# Patient Record
Sex: FEMALE | Race: WHITE | HISPANIC OR LATINO | Employment: UNEMPLOYED | URBAN - METROPOLITAN AREA
[De-identification: names, ages, dates, MRNs, and addresses within clinical notes are randomized per-mention and may not be internally consistent; named-entity substitution may affect disease eponyms.]

---

## 2018-10-04 ENCOUNTER — NON-PROVIDER VISIT (OUTPATIENT)
Dept: OBGYN | Facility: CLINIC | Age: 28
End: 2018-10-04

## 2018-10-04 DIAGNOSIS — Z32.01 PREGNANCY EXAMINATION OR TEST, POSITIVE RESULT: ICD-10-CM

## 2018-10-04 LAB
INT CON NEG: NEGATIVE
INT CON POS: POSITIVE
POC URINE PREGNANCY TEST: POSITIVE

## 2018-10-04 PROCEDURE — 81025 URINE PREGNANCY TEST: CPT | Performed by: OBSTETRICS & GYNECOLOGY

## 2018-10-16 ENCOUNTER — HOSPITAL ENCOUNTER (EMERGENCY)
Dept: HOSPITAL 8 - ED | Age: 28
Discharge: HOME | End: 2018-10-16
Payer: COMMERCIAL

## 2018-10-16 VITALS — DIASTOLIC BLOOD PRESSURE: 88 MMHG | SYSTOLIC BLOOD PRESSURE: 149 MMHG

## 2018-10-16 VITALS — HEIGHT: 67 IN | WEIGHT: 180.12 LBS | BODY MASS INDEX: 28.27 KG/M2

## 2018-10-16 DIAGNOSIS — O23.11: Primary | ICD-10-CM

## 2018-10-16 DIAGNOSIS — Z3A.08: ICD-10-CM

## 2018-10-16 LAB
ALBUMIN SERPL-MCNC: 4.1 G/DL (ref 3.4–5)
ALP SERPL-CCNC: 57 U/L (ref 45–117)
ALT SERPL-CCNC: 26 U/L (ref 12–78)
ANION GAP SERPL CALC-SCNC: 6 MMOL/L (ref 5–15)
BASOPHILS # BLD AUTO: 0.05 X10^3/UL (ref 0–0.1)
BASOPHILS NFR BLD AUTO: 1 % (ref 0–1)
BILIRUB SERPL-MCNC: 0.4 MG/DL (ref 0.2–1)
CALCIUM SERPL-MCNC: 8.7 MG/DL (ref 8.5–10.1)
CHLORIDE SERPL-SCNC: 106 MMOL/L (ref 98–107)
CREAT SERPL-MCNC: 0.72 MG/DL (ref 0.55–1.02)
CULTURE INDICATED?: YES
EOSINOPHIL # BLD AUTO: 0.08 X10^3/UL (ref 0–0.4)
EOSINOPHIL NFR BLD AUTO: 1 % (ref 1–7)
ERYTHROCYTE [DISTWIDTH] IN BLOOD BY AUTOMATED COUNT: 13.7 % (ref 9.6–15.2)
LYMPHOCYTES # BLD AUTO: 2.54 X10^3/UL (ref 1–3.4)
LYMPHOCYTES NFR BLD AUTO: 29 % (ref 22–44)
MCH RBC QN AUTO: 32 PG (ref 27–34.8)
MCHC RBC AUTO-ENTMCNC: 33.9 G/DL (ref 32.4–35.8)
MCV RBC AUTO: 94.3 FL (ref 80–100)
MD: NO
MICROSCOPIC: (no result)
MONOCYTES # BLD AUTO: 0.5 X10^3/UL (ref 0.2–0.8)
MONOCYTES NFR BLD AUTO: 6 % (ref 2–9)
NEUTROPHILS # BLD AUTO: 5.49 X10^3/UL (ref 1.8–6.8)
NEUTROPHILS NFR BLD AUTO: 63 % (ref 42–75)
PLATELET # BLD AUTO: 224 X10^3/UL (ref 130–400)
PMV BLD AUTO: 10.1 FL (ref 7.4–10.4)
PROT SERPL-MCNC: 8.8 G/DL (ref 6.4–8.2)
RBC # BLD AUTO: 4.79 X10^6/UL (ref 3.82–5.3)

## 2018-10-16 PROCEDURE — 85025 COMPLETE CBC W/AUTO DIFF WBC: CPT

## 2018-10-16 PROCEDURE — 81001 URINALYSIS AUTO W/SCOPE: CPT

## 2018-10-16 PROCEDURE — 84702 CHORIONIC GONADOTROPIN TEST: CPT

## 2018-10-16 PROCEDURE — 87077 CULTURE AEROBIC IDENTIFY: CPT

## 2018-10-16 PROCEDURE — 87086 URINE CULTURE/COLONY COUNT: CPT

## 2018-10-16 PROCEDURE — 36415 COLL VENOUS BLD VENIPUNCTURE: CPT

## 2018-10-16 PROCEDURE — 87186 SC STD MICRODIL/AGAR DIL: CPT

## 2018-10-16 PROCEDURE — 99285 EMERGENCY DEPT VISIT HI MDM: CPT

## 2018-10-16 PROCEDURE — 76801 OB US < 14 WKS SINGLE FETUS: CPT

## 2018-10-16 PROCEDURE — 80053 COMPREHEN METABOLIC PANEL: CPT

## 2019-02-07 NOTE — PROGRESS NOTES
Cc: New OB visit    Ms. Lauren is a 28 y.o.  presents today for a new OB appointment.  She first had a positive pregnancy test in early September.  She had a positive pregnancy test at [facility].  In November she had a UTI and so she went to [facility] and had an US performed.  She was 8 weeks along and they told her that her due date would be 19 which makes her 24w2d today.  Besides this UTI she has felt well during pregnancy.    She reports fetal movement, denies vaginal bleeding, denies leakage of fluid, denies contractions.     She denies nausea/vomiting, headaches, or urinary symptoms.        GYN History:  LMP 19. Menarche @18.  Menses irregular in that they occur anywhere from 1-3 months apart, lasting 5 days, not particularly heavy or painful.  Last pap unsure, no h/o abnormal pap, nor history of cone biopsy, LEEP or any other cervical, uterine or gynecologic surgery. No history of sexually transmitted diseases.       OB history:  G1 current      OB History    Para Term  AB Living   1             SAB TAB Ectopic Molar Multiple Live Births                    # Outcome Date GA Lbr Bijan/2nd Weight Sex Delivery Anes PTL Lv   1 Current                 History reviewed. No pertinent past medical history.   No PMH  No PSH   History reviewed. No pertinent surgical history.  Social History     Social History   • Marital status:      Spouse name: N/A   • Number of children: N/A   • Years of education: N/A     Occupational History   • Not on file.     Social History Main Topics   • Smoking status: Current Every Day Smoker     Years: 0.00   • Smokeless tobacco: Never Used   • Alcohol use No   • Drug use: Unknown   • Sexual activity: Yes     Partners: Male     Other Topics Concern   • Not on file     Social History Narrative   • No narrative on file     History reviewed. No pertinent family history.   Family history of DM2 in mom and dad    Allergies:   Allergies as of 2019   •  "(No Known Allergies)     PE:    Height 1.651 m (5' 5\"). /68 HR 61 183lbs    General: well developed, well nourished in no apparent distress  Head: normocephalic, atraumatic  Neck: neck is supple, non-tender, no thyromegaly, full range of motion  CVS: regular rate and rhythm, no peripheral edema  Lungs: Normal respiratory effort. Clear to auscultation bilaterally  Abdomen: Bowel sounds positive, nondistended, soft, nontender x4, no rebound or guarding.  Female GYN: normal external genitalia, no erythema, no discharge  Skin: No rashes, or ulcers or lesions seen  Psychiatric: appropriate affect, is alert and oriented x3, intact judgment and insight.    FHT: 140  FH: 24    A/P:  28 y.o.  @ 24w2d by reported 8wk US who presents for NOB appt    1. Prenatal care, subsequent pregnancy in second trimester  PRENATAL PANEL 3+HIV+UACXI    US-OB 2ND 3RD TRI COMPLETE    THINPREP RFLX HPV ASCUS W/CTNG    GLUCOSE 1HR GESTATIONAL    T.PALLIDUM AB EIA   2. 24 weeks gestation of pregnancy       #Prenatal care.  Patient was oriented to our obstetric practice and frequency of visits is discussed.  She is encouraged to continue prenatal vitamin use.  We reviewed safe foods and appropraite exercise during pregnancy.  We have requested records from [place] to obtain ultrasound which was performed in the ER at about 8 weeks gestation which will date this pregnancy.  Anatomy ultrasound also ordered.  Prenatal labs as well as second trimester labs ordered.  Too late for AFP testing.  #NOB panel, 1hr GTT, US ordered  #NOB packet given  #precautions reviewed.  #Return to clinic in 2-4 weeks.  LKB         "

## 2019-02-08 ENCOUNTER — HOSPITAL ENCOUNTER (OUTPATIENT)
Facility: MEDICAL CENTER | Age: 29
End: 2019-02-08
Attending: OBSTETRICS & GYNECOLOGY
Payer: COMMERCIAL

## 2019-02-08 ENCOUNTER — INITIAL PRENATAL (OUTPATIENT)
Dept: OBGYN | Facility: CLINIC | Age: 29
End: 2019-02-08
Payer: COMMERCIAL

## 2019-02-08 VITALS
HEART RATE: 61 BPM | HEIGHT: 65 IN | BODY MASS INDEX: 30.49 KG/M2 | WEIGHT: 183 LBS | SYSTOLIC BLOOD PRESSURE: 114 MMHG | DIASTOLIC BLOOD PRESSURE: 68 MMHG

## 2019-02-08 DIAGNOSIS — Z3A.24 24 WEEKS GESTATION OF PREGNANCY: ICD-10-CM

## 2019-02-08 DIAGNOSIS — Z34.82 PRENATAL CARE, SUBSEQUENT PREGNANCY IN SECOND TRIMESTER: ICD-10-CM

## 2019-02-08 PROCEDURE — 87591 N.GONORRHOEAE DNA AMP PROB: CPT

## 2019-02-08 PROCEDURE — 59401 PR NEW OB VISIT: CPT | Performed by: OBSTETRICS & GYNECOLOGY

## 2019-02-08 PROCEDURE — 87491 CHLMYD TRACH DNA AMP PROBE: CPT

## 2019-02-08 PROCEDURE — 88175 CYTOPATH C/V AUTO FLUID REDO: CPT

## 2019-02-09 DIAGNOSIS — Z34.82 PRENATAL CARE, SUBSEQUENT PREGNANCY IN SECOND TRIMESTER: ICD-10-CM

## 2019-02-13 LAB
C TRACH DNA GENITAL QL NAA+PROBE: NEGATIVE
CYTOLOGY REG CYTOL: NORMAL
N GONORRHOEA DNA GENITAL QL NAA+PROBE: NEGATIVE
SPECIMEN SOURCE: NORMAL

## 2019-02-14 ENCOUNTER — HOSPITAL ENCOUNTER (OUTPATIENT)
Dept: LAB | Facility: MEDICAL CENTER | Age: 29
End: 2019-02-14
Attending: OBSTETRICS & GYNECOLOGY
Payer: COMMERCIAL

## 2019-02-14 DIAGNOSIS — Z34.82 PRENATAL CARE, SUBSEQUENT PREGNANCY IN SECOND TRIMESTER: ICD-10-CM

## 2019-02-14 LAB
ABO GROUP BLD: NORMAL
APPEARANCE UR: CLEAR
BACTERIA #/AREA URNS HPF: ABNORMAL /HPF
BASOPHILS # BLD AUTO: 0.5 % (ref 0–1.8)
BASOPHILS # BLD: 0.04 K/UL (ref 0–0.12)
BILIRUB UR QL STRIP.AUTO: NEGATIVE
BLD GP AB SCN SERPL QL: NORMAL
COLOR UR: YELLOW
EOSINOPHIL # BLD AUTO: 0.06 K/UL (ref 0–0.51)
EOSINOPHIL NFR BLD: 0.7 % (ref 0–6.9)
EPI CELLS #/AREA URNS HPF: ABNORMAL /HPF
ERYTHROCYTE [DISTWIDTH] IN BLOOD BY AUTOMATED COUNT: 45.7 FL (ref 35.9–50)
GLUCOSE 1H P 50 G GLC PO SERPL-MCNC: 107 MG/DL (ref 70–139)
GLUCOSE UR STRIP.AUTO-MCNC: NEGATIVE MG/DL
HBV SURFACE AG SER QL: NEGATIVE
HCT VFR BLD AUTO: 38.2 % (ref 37–47)
HGB BLD-MCNC: 12.4 G/DL (ref 12–16)
HIV 1+2 AB+HIV1 P24 AG SERPL QL IA: NON REACTIVE
HYALINE CASTS #/AREA URNS LPF: ABNORMAL /LPF
IMM GRANULOCYTES # BLD AUTO: 0.05 K/UL (ref 0–0.11)
IMM GRANULOCYTES NFR BLD AUTO: 0.6 % (ref 0–0.9)
KETONES UR STRIP.AUTO-MCNC: NEGATIVE MG/DL
LEUKOCYTE ESTERASE UR QL STRIP.AUTO: ABNORMAL
LYMPHOCYTES # BLD AUTO: 1.86 K/UL (ref 1–4.8)
LYMPHOCYTES NFR BLD: 21.9 % (ref 22–41)
MCH RBC QN AUTO: 31.8 PG (ref 27–33)
MCHC RBC AUTO-ENTMCNC: 32.5 G/DL (ref 33.6–35)
MCV RBC AUTO: 97.9 FL (ref 81.4–97.8)
MICRO URNS: ABNORMAL
MONOCYTES # BLD AUTO: 0.44 K/UL (ref 0–0.85)
MONOCYTES NFR BLD AUTO: 5.2 % (ref 0–13.4)
NEUTROPHILS # BLD AUTO: 6.05 K/UL (ref 2–7.15)
NEUTROPHILS NFR BLD: 71.1 % (ref 44–72)
NITRITE UR QL STRIP.AUTO: NEGATIVE
NRBC # BLD AUTO: 0 K/UL
NRBC BLD-RTO: 0 /100 WBC
PH UR STRIP.AUTO: 6.5 [PH]
PLATELET # BLD AUTO: 187 K/UL (ref 164–446)
PMV BLD AUTO: 12.3 FL (ref 9–12.9)
PROT UR QL STRIP: NEGATIVE MG/DL
RBC # BLD AUTO: 3.9 M/UL (ref 4.2–5.4)
RBC # URNS HPF: ABNORMAL /HPF
RBC UR QL AUTO: NEGATIVE
RH BLD: NORMAL
RUBV AB SER QL: 70 IU/ML
SP GR UR STRIP.AUTO: 1.02
TREPONEMA PALLIDUM IGG+IGM AB [PRESENCE] IN SERUM OR PLASMA BY IMMUNOASSAY: NON REACTIVE
UROBILINOGEN UR STRIP.AUTO-MCNC: 0.2 MG/DL
WBC # BLD AUTO: 8.5 K/UL (ref 4.8–10.8)
WBC #/AREA URNS HPF: ABNORMAL /HPF

## 2019-02-14 PROCEDURE — 86900 BLOOD TYPING SEROLOGIC ABO: CPT

## 2019-02-14 PROCEDURE — 86850 RBC ANTIBODY SCREEN: CPT

## 2019-02-14 PROCEDURE — 87389 HIV-1 AG W/HIV-1&-2 AB AG IA: CPT

## 2019-02-14 PROCEDURE — 36415 COLL VENOUS BLD VENIPUNCTURE: CPT

## 2019-02-14 PROCEDURE — 82950 GLUCOSE TEST: CPT

## 2019-02-14 PROCEDURE — 81001 URINALYSIS AUTO W/SCOPE: CPT

## 2019-02-14 PROCEDURE — 86901 BLOOD TYPING SEROLOGIC RH(D): CPT

## 2019-02-14 PROCEDURE — 85025 COMPLETE CBC W/AUTO DIFF WBC: CPT

## 2019-02-14 PROCEDURE — 86762 RUBELLA ANTIBODY: CPT

## 2019-02-14 PROCEDURE — 86780 TREPONEMA PALLIDUM: CPT

## 2019-02-14 PROCEDURE — 87340 HEPATITIS B SURFACE AG IA: CPT

## 2019-02-19 ENCOUNTER — HOSPITAL ENCOUNTER (OUTPATIENT)
Dept: RADIOLOGY | Facility: MEDICAL CENTER | Age: 29
End: 2019-02-19
Attending: OBSTETRICS & GYNECOLOGY
Payer: COMMERCIAL

## 2019-02-19 DIAGNOSIS — Z34.82 PRENATAL CARE, SUBSEQUENT PREGNANCY IN SECOND TRIMESTER: ICD-10-CM

## 2019-02-19 PROCEDURE — 76805 OB US >/= 14 WKS SNGL FETUS: CPT

## 2019-03-11 ENCOUNTER — ROUTINE PRENATAL (OUTPATIENT)
Dept: OBGYN | Facility: CLINIC | Age: 29
End: 2019-03-11
Payer: COMMERCIAL

## 2019-03-11 VITALS — DIASTOLIC BLOOD PRESSURE: 70 MMHG | WEIGHT: 186 LBS | BODY MASS INDEX: 30.95 KG/M2 | SYSTOLIC BLOOD PRESSURE: 110 MMHG

## 2019-03-11 DIAGNOSIS — Z34.03 ENCOUNTER FOR SUPERVISION OF NORMAL FIRST PREGNANCY IN THIRD TRIMESTER: Primary | ICD-10-CM

## 2019-03-11 DIAGNOSIS — R87.612 LGSIL ON PAP SMEAR OF CERVIX: ICD-10-CM

## 2019-03-11 PROBLEM — Z34.93 SUPERVISION OF NORMAL PREGNANCY IN THIRD TRIMESTER: Status: ACTIVE | Noted: 2019-03-11

## 2019-03-11 PROCEDURE — 90040 PR PRENATAL FOLLOW UP: CPT | Performed by: NURSE PRACTITIONER

## 2019-03-11 PROCEDURE — 90715 TDAP VACCINE 7 YRS/> IM: CPT | Performed by: NURSE PRACTITIONER

## 2019-03-11 PROCEDURE — 90471 IMMUNIZATION ADMIN: CPT | Performed by: NURSE PRACTITIONER

## 2019-03-11 NOTE — PATIENT INSTRUCTIONS
P:  1.  PP contraception pills?           2.  Instructions given on FKCs.          3.  Questions answered.          4.  Encouraged pt to tour L&D.          5.  Encourage adequate water intake.        6.  Labs reviewed w pt.        7.   labor precautions reviewed.         8.  F/u 2 wks.        9.  TDap given and flu declined.      10.  Needs colpo asap.       11.  Limited US ordered to complete anatomy.

## 2019-03-11 NOTE — PROGRESS NOTES
S:  Pt is  at 28w5d for routine OB follow up.  Reports some bone pain.  Reports good FM.  Denies VB, LOF, RUCs or vaginal DC.    O:  Please see above vitals.        FHTs: 135        Fundal ht: 28 cm.    Complete OB US  2019 1:57 PM    HISTORY/REASON FOR EXAM:  Evaluate fetal anatomy    TECHNIQUE/EXAM DESCRIPTION: OB complete ultrasound.    COMPARISON:  None    FINDINGS:  Fetal Lie:  Vertex  LMP:  2018  Clinical RAIMUNDO by LMP:  2019    Placenta (Location):  Posterior  Placenta Previa: No  Placental Grade: I    Amniotic Fluid Volume:  SISSY = 15.3 cm    Fetal Heart Rate:  144 bpm    Cervical Length:  5.42 cm transabdominal    No maternal adnexal mass is identified.    Umbilical Artery S/D Ratio(s):  Not applicable    Fetal Anatomy  (Seen or Not Seen)  Lateral Ventricles     Seen  Cisterna Magna        Seen  Cerebellum              Seen  CSP             Seen  Orbits             Seen  Face/Lips                Seen  Cord Insertion         Seen  Placental CI         Seen  4 Chamber Heart     Seen  LVOT               NOT seen  RVOT              Seen  3 Vessel View     NOT seen  Stomach       Seen  Kidneys                   Seen  Urinary Bladder      Seen  Spine                       Seen  3 Vessel Cord          Seen  Both Upper Extremities    Seen  Both Lower Extremities    Seen  Diaphragm             Seen  Movement       Seen  Gender:  Likely male    Fetal Biometry  BPD    6.50 cm, 26w 2d  HC    23.13 cm, 25w 1d  AC    19.60 cm, 24w 2d  Femur Length    4.57 cm, 25w 1d  Humerus Length    4.20 cm, 25w 2d  Cerebellum Diameter   2.69 cm    EGA by this US:  25w 2d  RAIMUNDO by this US: 2019  RAIMUNDO by 1st US:  2019    Estimated Fetal Weight:  734 g    Comments:  The left ventricular outflow tract and the three-vessel view cannot be adequately evaluated because they were not well seen.   Impression       Single intrauterine pregnancy of an estimated gestational age of 25w 2d with an estimated date of delivery of  2019.    LVOT and three-vessel view were not well seen and therefore images could not be obtained. The remaining fetal anatomic survey was within the limits of normal.         A:  IUP at 28w5d  Patient Active Problem List    Diagnosis Date Noted   • LGSIL on Pap smear of cervix 2019   • Supervision of normal pregnancy in third trimester 2019        P:  1.  PP contraception pills?           2.  Instructions given on FKCs.          3.  Questions answered.          4.  Encouraged pt to tour L&D.          5.  Encourage adequate water intake.        6.  Labs reviewed w pt.        7.   labor precautions reviewed.         8.  F/u 2 wks.        9.  TDap given and flu declined.      10.  Needs colpo asap.       11.  Limited US ordered to complete anatomy.    I have placed the below orders and discussed them with an approved delegating provider. The MA is performing the below orders under the direction of  Dr. Sutherland.

## 2019-03-11 NOTE — PROGRESS NOTES
Pt here today for OB follow up  Pt states her bones hurt.  She is feeling dizzy throughout the day and is always tired.  Reports +FM  Pharmacy Confirmed.  Chaperone offered and accepted  ALEX count and TDAP given today.

## 2019-03-11 NOTE — LETTER
"Count Your Baby's Movements  Another step to a healthy delivery    A Epic Dress Re Test             Dept: [phone]    How Many Weeks Pregnant? 28w 5d    Date to Begin Counting: 3/11/2018              How to use this chart    One way for your physician to keep track of your baby's health is by knowing how often the baby moves (or \"kicks\") in your womb.  You can help your physician to do this by using this chart every day.    Every day, you should see how many hours it takes for your baby to move 10 times.  Start in the morning, as soon as you get up.    · First, write down the time your baby moves until you get to 10.  · Check off one box every time your baby moves until you get to 10.  · Write down the time you finished counting in the last column.  · Total how long it took to count up all 10 movements.  · Finally, fill in the box that shows how long this took.  After counting 10 movements, you no longer have to count any more that day.  The next morning, just start counting again as soon as you get up.    What should you call a \"movement\"?  It is hard to say, because it will feel different from one mother to another and from one pregnancy to the next.  The important thing is that you count the movements the same way throughout your pregnancy.  If you have more questions, you should ask your physician.    Count carefully every day!  SAMPLE:  Week 28    How many hours did it take to feel 10 movements?       Start  Time     1     2     3     4     5     6     7     8     9     10   Finish Time   Mon 8:20 ·  ·  ·  ·  ·  ·  ·  ·  ·  ·  11:40   Tue Wed Thu Fri               Sat               Sun                 IMPORTANT: You should contact your physician if it takes more than two hours for you to feel 10 movements.  Each morning, write down the time and start to count the movements of your baby.  Keep track by checking off one box every time you feel one movement.  When you " "have felt 10 \"kicks\", write down the time you finished counting in the last column.  Then fill in the   box (over the check murali) for the number of hours it took.  Be sure to read the complete instructions on the previous page.            "

## 2019-03-12 ENCOUNTER — HOSPITAL ENCOUNTER (OUTPATIENT)
Dept: RADIOLOGY | Facility: MEDICAL CENTER | Age: 29
End: 2019-03-12
Attending: NURSE PRACTITIONER
Payer: COMMERCIAL

## 2019-03-12 DIAGNOSIS — Z34.03 ENCOUNTER FOR SUPERVISION OF NORMAL FIRST PREGNANCY IN THIRD TRIMESTER: ICD-10-CM

## 2019-03-12 PROCEDURE — 76816 OB US FOLLOW-UP PER FETUS: CPT

## 2019-03-23 ENCOUNTER — APPOINTMENT (OUTPATIENT)
Dept: RADIOLOGY | Facility: MEDICAL CENTER | Age: 29
End: 2019-03-23
Attending: OBSTETRICS & GYNECOLOGY
Payer: COMMERCIAL

## 2019-03-23 ENCOUNTER — HOSPITAL ENCOUNTER (EMERGENCY)
Facility: MEDICAL CENTER | Age: 29
End: 2019-03-23
Payer: COMMERCIAL

## 2019-03-23 ENCOUNTER — HOSPITAL ENCOUNTER (OUTPATIENT)
Facility: MEDICAL CENTER | Age: 29
End: 2019-03-23
Attending: OBSTETRICS & GYNECOLOGY | Admitting: OBSTETRICS & GYNECOLOGY
Payer: COMMERCIAL

## 2019-03-23 VITALS
TEMPERATURE: 98.5 F | RESPIRATION RATE: 16 BRPM | HEIGHT: 65 IN | BODY MASS INDEX: 30.99 KG/M2 | HEART RATE: 75 BPM | DIASTOLIC BLOOD PRESSURE: 67 MMHG | WEIGHT: 186 LBS | SYSTOLIC BLOOD PRESSURE: 114 MMHG

## 2019-03-23 LAB
A1 MICROGLOB PLACENTAL VAG QL: NEGATIVE
APPEARANCE UR: CLEAR
COLOR UR AUTO: YELLOW
CRYSTALS AMN MICRO: NORMAL
GLUCOSE UR QL STRIP.AUTO: NEGATIVE MG/DL
KETONES UR QL STRIP.AUTO: NEGATIVE MG/DL
LEUKOCYTE ESTERASE UR QL STRIP.AUTO: NEGATIVE
NITRITE UR QL STRIP.AUTO: NEGATIVE
PH UR STRIP.AUTO: 7 [PH]
PROT UR QL STRIP: NEGATIVE MG/DL
RBC UR QL AUTO: NEGATIVE
SP GR UR: 1.01

## 2019-03-23 PROCEDURE — 76815 OB US LIMITED FETUS(S): CPT

## 2019-03-23 PROCEDURE — 89060 EXAM SYNOVIAL FLUID CRYSTALS: CPT

## 2019-03-23 PROCEDURE — 81002 URINALYSIS NONAUTO W/O SCOPE: CPT

## 2019-03-23 PROCEDURE — 59025 FETAL NON-STRESS TEST: CPT

## 2019-03-23 PROCEDURE — 84112 EVAL AMNIOTIC FLUID PROTEIN: CPT

## 2019-03-23 NOTE — PROGRESS NOTES
EDC  30 3/7     1400-pt presents from home with c/o possibly leaking this afternoon, states that she woke up around 1300 and when she stood up she noticed a wet spot on her bed, pt did state that she had intercourse earlier this morning, no c/o leaking since she noticed the spot, no c/o bleeding, pain or uc's, states baby is moving normally, placed on external monitors, vs taken, SSE performed, no pooling noted, small amount of Robinson fluid noted, fern slide prepared and sent, SVE fingertip/thick/floating  1430-fern back positive, TC Dr Martinez, report given, wants amnisure run, will go observe slide herself later  1435-amnisure collected and sent  1500-amnisure back negative, TC Dr Martinez, report given, OB US for SISSY and growth ordered  1533-US here, SISSY 16.6 cm, S=D for growth  1545-TC Dr Martinez, report given, in to assess pt, discussed pelvic rest with pt and gave instructions to come back in if anymore leaking and to keep appt on 3/25, discharge order received  1605-pt discharged home with PTL precautions and further instruction to come back if she noted any more leaking, verbalized understanding, left ambulatory with family

## 2019-03-23 NOTE — PROGRESS NOTES
"Leslie Loja   30w3d  Admission DX: Pregnancy    Date of Admission: 3/23/2019  Patient Active Problem List    Diagnosis Date Noted   • LGSIL on Pap smear of cervix 2019   • Supervision of normal pregnancy in third trimester 2019       Subjective:   Pt presented with LOF today after IC. No vb, no tightening, cramping or UC. Good FM    Objective:   Vitals:    19 1407   BP: 114/67   Pulse: 75   Weight: 84.4 kg (186 lb)   Height: 1.651 m (5' 5\")     FHT: 130/mod leticia/+acels  Gen: comfortale  SVE: no pooling  Abdomen: gravid, soft, NT  Ext: NT, no edema    Meds:   No current facility-administered medications for this encounter.     Labs:    Lab:   Recent Results (from the past 72 hour(s))   Ferning if suspected rupture of membranes (ROM)    Collection Time: 19  2:10 PM   Result Value Ref Range    Fern Test On Amniotic Fluid see below Not present   POC UA    Collection Time: 19  2:14 PM   Result Value Ref Range    POC Color Yellow     POC Appearance Clear     POC Glucose Negative Negative mg/dL    POC Ketones Negative Negative mg/dL    POC Specific Gravity 1.015 1.005 - 1.030    POC Blood Negative Negative    POC Urine PH 7.0 5.0 - 8.0    POC Protein Negative Negative mg/dL    POC Nitrites Negative Negative    POC Leukocyte Esterase Negative Negative   AMNISURE ROM ASSAY    Collection Time: 19  2:35 PM   Result Value Ref Range    AmniSure ROM Negative Negative       Assessment:  Leslie Loja is a 28 y.o.  @ 30w3d with LOF after IC.  Amnisure neg.  SISSY 16 cm  Cat 1 tracing    Plan:  DC home  Strict PPROM and PTL precautions reviewed  F/u with [facility] Monday          "

## 2019-03-25 ENCOUNTER — ROUTINE PRENATAL (OUTPATIENT)
Dept: OBGYN | Facility: CLINIC | Age: 29
End: 2019-03-25
Payer: COMMERCIAL

## 2019-03-25 VITALS — WEIGHT: 189 LBS | BODY MASS INDEX: 31.45 KG/M2 | SYSTOLIC BLOOD PRESSURE: 126 MMHG | DIASTOLIC BLOOD PRESSURE: 82 MMHG

## 2019-03-25 DIAGNOSIS — Z34.03 SUPERVISION OF NORMAL FIRST PREGNANCY IN THIRD TRIMESTER: ICD-10-CM

## 2019-03-25 PROCEDURE — 90040 PR PRENATAL FOLLOW UP: CPT | Performed by: ADVANCED PRACTICE MIDWIFE

## 2019-03-25 NOTE — PROGRESS NOTES
Pt here today for OB follow up @ 30w5d  Pt denies vaginal bleeding or cramps/contractions  Reports +FM  Good # verified  Pharmacy Confirmed.

## 2019-03-25 NOTE — PROGRESS NOTES
Patient here for BERE visit at 30.5 of pregnancy. She affirms fetal movement, denies vaginal bleeding or cramping/regular contractions. She reports overall today she is feeling well and without complaints. She was seen in L & D for concern of leaking fluid. We reviewed fluid and contractions in detail. Adequate hydration reviewed in detail with patient. Unsure of PP BCM at this time. Will likely start with condoms. Precautions and warning signs reviewed with patient. RTC in 2 week(s) for BERE visit.

## 2019-04-05 ENCOUNTER — ROUTINE PRENATAL (OUTPATIENT)
Dept: OBGYN | Facility: MEDICAL CENTER | Age: 29
End: 2019-04-05
Payer: COMMERCIAL

## 2019-04-05 VITALS — SYSTOLIC BLOOD PRESSURE: 120 MMHG | WEIGHT: 192 LBS | BODY MASS INDEX: 31.95 KG/M2 | DIASTOLIC BLOOD PRESSURE: 70 MMHG

## 2019-04-05 DIAGNOSIS — Z34.03 SUPERVISION OF NORMAL FIRST PREGNANCY IN THIRD TRIMESTER: ICD-10-CM

## 2019-04-05 PROCEDURE — 90040 PR PRENATAL FOLLOW UP: CPT | Performed by: NURSE PRACTITIONER

## 2019-04-05 NOTE — PROGRESS NOTES
SUBJECTIVE:  Pt is a 28 y.o.   at 32w2d  gestation. Presents today for follow-up prenatal care. Reports no issues at this time except for vaginal itching for which she would like to try monistat 7.  Had one episode of cramping that concerned her but resolved.   Reports feeling good  fetal movement. Denies cramping/contractions, bleeding or leaking of fluid. Denies dysuria, headaches, N/V. Generally feels well today. Recent ER or L&D visits  - none.     OBJECTIVE:  - See prenatal vitals flow  -   Vitals:    19 1327   BP: 120/70   Weight: 87.1 kg (192 lb)      Fundal Height - 33  FHT - 139  US normal with limited cardiac visualization  Prenatal labs normal prenatal panel, normal glucose. Abnormal pap              ASSESSMENT:   - IUP at 32w2d    - S=D   -   Patient Active Problem List    Diagnosis Date Noted   • LGSIL on Pap smear of cervix 2019     Priority: High   • Supervision of normal pregnancy in third trimester 2019     Priority: High         PLAN:  - S/sx pregnancy and labor warning signs vs general discomforts discussed  - Fetal movements and kick counts reviewed   - Adequate hydration reinforced  - Nutrition/exercise/vitamin education; continued PNV  - if monistat 7 does not resolve sx she will let us know  Return 2 weeks.

## 2019-04-05 NOTE — PROGRESS NOTES
Pt here today for OB follow up  Pt states denies VB and LOF   Reports +FM  Pharmacy Confirmed.

## 2019-04-17 ENCOUNTER — ROUTINE PRENATAL (OUTPATIENT)
Dept: OBGYN | Facility: CLINIC | Age: 29
End: 2019-04-17
Payer: COMMERCIAL

## 2019-04-17 VITALS — BODY MASS INDEX: 32.12 KG/M2 | SYSTOLIC BLOOD PRESSURE: 116 MMHG | WEIGHT: 193 LBS | DIASTOLIC BLOOD PRESSURE: 78 MMHG

## 2019-04-17 DIAGNOSIS — Z34.83 ENCOUNTER FOR SUPERVISION OF OTHER NORMAL PREGNANCY IN THIRD TRIMESTER: ICD-10-CM

## 2019-04-17 DIAGNOSIS — R87.612 LGSIL ON PAP SMEAR OF CERVIX: ICD-10-CM

## 2019-04-17 PROCEDURE — 90040 PR PRENATAL FOLLOW UP: CPT | Performed by: OBSTETRICS & GYNECOLOGY

## 2019-04-17 NOTE — PROGRESS NOTES
S: Pt presents for routine OB follow up. Reports Good fetal movements.  No contractions, vaginal bleeding, or leakage of fluid.    Questions answered.    O: /78   Wt 87.5 kg (193 lb)   BMI 32.12 kg/m²   Patients' weight gain, fluid intake and exercise level discussed.  Vitals, fundal height , fetal position, and FHR reviewed on flowsheet    Lab:No results found for this or any previous visit (from the past 336 hour(s)).    A/P:  28 y.o.  at 34w0d presents for routine obstetric follow-up.  Size equals dates   Encounter Diagnoses   Name Primary?   • Encounter for supervision of other normal pregnancy in third trimester    • LGSIL on Pap smear of cervix          1.  Continue prenatal vitamins.  2.  Fetal kick counts daily discussed.  3.  Exercise at least 30 minutes daily.  4.  Drink at least 2L of water daily  5.  Pregnancy/  Labor precautions discussed  6.  Follow-up in 1 week.  7.  GBS at f/u visit discussed

## 2019-04-17 NOTE — PROGRESS NOTES
Pt here today for OB follow up  Pt denies vaginal bleeding or contractopms  Reports +FM  Good # verified  Pharmacy Confirmed.

## 2019-05-02 ENCOUNTER — HOSPITAL ENCOUNTER (OUTPATIENT)
Facility: MEDICAL CENTER | Age: 29
End: 2019-05-02
Attending: OBSTETRICS & GYNECOLOGY
Payer: COMMERCIAL

## 2019-05-02 ENCOUNTER — ROUTINE PRENATAL (OUTPATIENT)
Dept: OBGYN | Facility: CLINIC | Age: 29
End: 2019-05-02
Payer: COMMERCIAL

## 2019-05-02 VITALS — WEIGHT: 195 LBS | SYSTOLIC BLOOD PRESSURE: 118 MMHG | BODY MASS INDEX: 32.45 KG/M2 | DIASTOLIC BLOOD PRESSURE: 70 MMHG

## 2019-05-02 DIAGNOSIS — Z34.93 THIRD TRIMESTER PREGNANCY: ICD-10-CM

## 2019-05-02 PROCEDURE — 87081 CULTURE SCREEN ONLY: CPT

## 2019-05-02 PROCEDURE — 90040 PR PRENATAL FOLLOW UP: CPT | Performed by: OBSTETRICS & GYNECOLOGY

## 2019-05-02 PROCEDURE — 87150 DNA/RNA AMPLIFIED PROBE: CPT

## 2019-05-02 NOTE — PROGRESS NOTES
S: Pt presents for routine OB follow up. Good fetal movement.  No contractions, vaginal bleeding, or leakage of fluid.    Questions answered.    Is complaining of left-sided pain that was worse last night.  After sleeping it feels much better but she still experiencing the left-sided pain.  She does admit to constipation and will go to sometimes 3 days without having a bowel movement.    O: /70   Wt 88.5 kg (195 lb)   BMI 32.45 kg/m²   Patients' weight gain, fluid intake and exercise level discussed.  Vitals, fundal height , fetal position, and FHR reviewed on flowsheet    Lab:No results found for this or any previous visit (from the past 336 hour(s)).    A/P:  28 y.o.  at 36w1d presents for routine obstetric follow-up.  Size equals dates and/or scan    1.  Continue prenatal vitamins.  2.  Fetal kick counts.  3.  Exercise at least 30 minutes daily.  4.  Drink at least 2L of water daily  5.  Labor precautions educated.  6.  Follow-up in 1 weeks.  7.  GBS done today  8.  Discussed that her pain is likely related to constipation/gas.  Recommended Colace as a stool softener as well as Gas-X for gas pain.  Recommended that she only take the Gas-X when she is having pain.  Recommended Colace nightly.  If she is still unable to have a bowel movement after using the Colace, she can use MiraLAX to have bowel movements more regularly.

## 2019-05-02 NOTE — PROGRESS NOTES
Pt here today for OB follow up  Pt states she had abdominal cramping last night  Reports +FM  Pharmacy Confirmed.  Chaperone offered and declined   GBS today

## 2019-05-03 LAB — GP B STREP DNA SPEC QL NAA+PROBE: NEGATIVE

## 2019-05-09 ENCOUNTER — ROUTINE PRENATAL (OUTPATIENT)
Dept: OBGYN | Facility: MEDICAL CENTER | Age: 29
End: 2019-05-09
Payer: COMMERCIAL

## 2019-05-09 VITALS — BODY MASS INDEX: 32.62 KG/M2 | WEIGHT: 196 LBS | DIASTOLIC BLOOD PRESSURE: 72 MMHG | SYSTOLIC BLOOD PRESSURE: 118 MMHG

## 2019-05-09 DIAGNOSIS — Z34.83 ENCOUNTER FOR SUPERVISION OF OTHER NORMAL PREGNANCY IN THIRD TRIMESTER: ICD-10-CM

## 2019-05-09 PROCEDURE — 90040 PR PRENATAL FOLLOW UP: CPT | Performed by: OBSTETRICS & GYNECOLOGY

## 2019-05-09 NOTE — PROGRESS NOTES
Pt here today for OB follow up  Pt denies vaginal bleeding or cramps/contractions  Reports +FM  Good #  Verified  Pharmacy Confirmed.  GBS negative

## 2019-05-09 NOTE — PROGRESS NOTES
Chief complaint: Return OB visit    S: Pt presents for routine OB follow up. Good fetal movement.  No contractions, vaginal bleeding, or leakage of fluid.    Questions answered.    O: /72   Wt 88.9 kg (196 lb)   BMI 32.62 kg/m²   Patients' weight gain, fluid intake and exercise level discussed.  Vitals, fundal height , fetal position, and FHR reviewed on flowsheet    Lab:  Recent Results (from the past 336 hour(s))   GRP B STREP, BY PCR (DAWSON BROTH)    Collection Time: 19  3:02 PM   Result Value Ref Range    Strep Gp B DNA PCR Negative Negative       A/P:  28 y.o.  at 37w1d presents for routine obstetric follow-up.  Size equals dates and/or scan    1.  Continue prenatal vitamins.  2.  Fetal kick counts.  3.  Exercise at least 30 minutes daily.  4.  Drink at least 2L of water daily  5.  Labor precautions educated.  6.  Follow-up in 1 weeks.  7.  GBS negative    Labs reviewed the patient.    All questions answered

## 2019-05-16 ENCOUNTER — ROUTINE PRENATAL (OUTPATIENT)
Dept: OBGYN | Facility: CLINIC | Age: 29
End: 2019-05-16
Payer: COMMERCIAL

## 2019-05-16 VITALS — DIASTOLIC BLOOD PRESSURE: 80 MMHG | BODY MASS INDEX: 32.78 KG/M2 | WEIGHT: 197 LBS | SYSTOLIC BLOOD PRESSURE: 122 MMHG

## 2019-05-16 DIAGNOSIS — Z34.83 ENCOUNTER FOR SUPERVISION OF OTHER NORMAL PREGNANCY IN THIRD TRIMESTER: Primary | ICD-10-CM

## 2019-05-16 PROCEDURE — 90040 PR PRENATAL FOLLOW UP: CPT | Performed by: NURSE PRACTITIONER

## 2019-05-16 NOTE — PROGRESS NOTES
Pt here today for OB follow up  Pt states  Reports +FM  Good # verified  Pharmacy Confirmed.  Requested cervical check

## 2019-05-16 NOTE — PROGRESS NOTES
S) Pt is a 28 y.o.   at 38w1d  gestation. Routine prenatal care today. No complaints. She states she is having some contractions, but mostly at night. SVE and IOL referral today. Labor precautions discussed, all questions answered.    Fetal movement Normal  Cramping no  VB no  LOF no   Denies dysuria. Generally feels well today. Good self-care activities identified. Denies headaches, swelling, visual changes, or epigastric pain .     O) There were no vitals taken for this visit.        Labs:       PNL: WNL       GCT: 107        AFP: Not Examined       GBS: negative       Pertinent ultrasound -        19- Survey WNL, but not able to see LVOT and 3 VC, SISSY 15.3cm, c/w prev dating. Needs repeat scan  3/12/19- Follow up anatomy, all WNL, SISSY 15.8cm, c/w prev dating.    A) IUP at 38w1d       S=D         Patient Active Problem List    Diagnosis Date Noted   • LGSIL on Pap smear of cervix 2019     Priority: High   • Supervision of normal pregnancy in third trimester 2019     Priority: High          SVE: closed/50/-2       Chaperone offered: yes but declines         TDAP: yes       FLU: no        BTL: no       : n/a       C/S Consent: n/a       IOL or C/S scheduled: no- referral placed today       LAST PAP: 19- LGSIL- will repeat PP         P) s/s ptl vs general discomforts. Fetal movements reviewed. General ed and anticipatory guidance. Nutrition/exercise/vitamin. Plans breast Plans pp contraception- unsure  Continue PNV.

## 2019-05-19 ENCOUNTER — HOSPITAL ENCOUNTER (OUTPATIENT)
Facility: MEDICAL CENTER | Age: 29
End: 2019-05-19
Attending: OBSTETRICS & GYNECOLOGY | Admitting: OBSTETRICS & GYNECOLOGY
Payer: COMMERCIAL

## 2019-05-19 VITALS
HEIGHT: 65 IN | WEIGHT: 197 LBS | SYSTOLIC BLOOD PRESSURE: 133 MMHG | BODY MASS INDEX: 32.82 KG/M2 | RESPIRATION RATE: 16 BRPM | HEART RATE: 68 BPM | TEMPERATURE: 98.2 F | DIASTOLIC BLOOD PRESSURE: 76 MMHG

## 2019-05-19 PROCEDURE — 59025 FETAL NON-STRESS TEST: CPT

## 2019-05-19 PROCEDURE — 89060 EXAM SYNOVIAL FLUID CRYSTALS: CPT

## 2019-05-20 LAB — CRYSTALS AMN MICRO: NORMAL

## 2019-05-20 NOTE — PROGRESS NOTES
EDC  38      1640-pt presents from home with c/o leaking fluid and some uc's since yesterday, no c/o bleeding or other pain, states baby is moving normally, placed on external monitors, vs taken, SSE performed, no pooling noted, sanju slide prepared and sent, SVE closed/thick/high  1730-sanju back negative, TC Dr Wakefield, report given, discharge order received  1735-pt discharged home with labor precautions, verbalized understanding, left ambulatory with SO

## 2019-05-23 ENCOUNTER — ROUTINE PRENATAL (OUTPATIENT)
Dept: OBGYN | Facility: CLINIC | Age: 29
End: 2019-05-23
Payer: COMMERCIAL

## 2019-05-23 VITALS — BODY MASS INDEX: 33.12 KG/M2 | DIASTOLIC BLOOD PRESSURE: 80 MMHG | WEIGHT: 199 LBS | SYSTOLIC BLOOD PRESSURE: 122 MMHG

## 2019-05-23 DIAGNOSIS — R87.612 LGSIL ON PAP SMEAR OF CERVIX: ICD-10-CM

## 2019-05-23 DIAGNOSIS — Z34.03 ENCOUNTER FOR SUPERVISION OF NORMAL FIRST PREGNANCY IN THIRD TRIMESTER: ICD-10-CM

## 2019-05-23 PROCEDURE — 90040 PR PRENATAL FOLLOW UP: CPT | Performed by: OBSTETRICS & GYNECOLOGY

## 2019-05-23 NOTE — PROGRESS NOTES
S: Pt presents for routine OB follow up. Reports normal fetal movements.  Reports irregular contractions-requested cervical exam. No  vaginal bleeding, or leakage of fluid.    Questions answered.    O: /80   Wt 90.3 kg (199 lb)   BMI 33.12 kg/m²   Patients' weight gain, fluid intake and exercise level discussed.  Vitals, fundal height , fetal position, and FHR reviewed on flowsheet    Lab:  Recent Results (from the past 336 hour(s))   FERN TEST    Collection Time: 19  4:45 PM   Result Value Ref Range    Fern Test On Amniotic Fluid see below Not present       A/P:  28 y.o.  at 39w1d presents for routine obstetric follow-up.  Size equals dates     1.  Continue prenatal vitamins.  2.  Fetal kick counts daily  discussed.  3.  Exercise at least 30 minutes daily.  4.  Drink at least 2L of water daily  5.  Pregnancy and labor precautions reviewed  6.  Follow-up in 1 week  7.  GBS neg  8.  Patient has induction of labor appointment scheduled

## 2019-05-23 NOTE — PROGRESS NOTES
Pt. Here for OB/FU. Reports Good FM.   Good # [phone]  Pt states no complaints.   Pt was seen at [facility] L&D on 5/19/19 for leaking of fluid.  Pharmacy verified.   Chaperone offered and provided.   GBS negative  Pt scheduled for Gel on 6/4/19 @ 9:00 pm IOL on 6/5/19 0900 am pt notified and given instructions today.

## 2019-05-30 ENCOUNTER — ROUTINE PRENATAL (OUTPATIENT)
Dept: OBGYN | Facility: CLINIC | Age: 29
End: 2019-05-30
Payer: COMMERCIAL

## 2019-05-30 VITALS — SYSTOLIC BLOOD PRESSURE: 120 MMHG | DIASTOLIC BLOOD PRESSURE: 74 MMHG

## 2019-05-30 DIAGNOSIS — Z34.93 THIRD TRIMESTER PREGNANCY: ICD-10-CM

## 2019-05-30 PROCEDURE — 90040 PR PRENATAL FOLLOW UP: CPT | Performed by: OBSTETRICS & GYNECOLOGY

## 2019-05-30 NOTE — PROGRESS NOTES
S: Pt presents for routine OB follow up. Good fetal movement.  No contractions, vaginal bleeding, or leakage of fluid.    Questions answered.    O: /74   Patients' weight gain, fluid intake and exercise level discussed.  Vitals, fundal height , fetal position, and FHR reviewed on flowsheet    Lab:  Recent Results (from the past 336 hour(s))   FERN TEST    Collection Time: 19  4:45 PM   Result Value Ref Range    Fern Test On Amniotic Fluid see below Not present       A/P:  28 y.o.  at 40w1d presents for routine obstetric follow-up.  Size equals dates and/or scan    1.  Continue prenatal vitamins.  2.  Fetal kick counts.  3.  Exercise at least 30 minutes daily.  4.  Drink at least 2L of water daily  5.  Labor precautions educated.  6.  IOL scheduled for 19  7.  GBS negative

## 2019-05-30 NOTE — PROGRESS NOTES
Pt here today for OB follow up  Pt states she would like to be checked and is having irregular contractions  Reports +FM  Pharmacy Confirmed.  Chaperone offered and declined.

## 2019-06-04 ENCOUNTER — APPOINTMENT (OUTPATIENT)
Dept: OBGYN | Facility: MEDICAL CENTER | Age: 29
End: 2019-06-04
Attending: OBSTETRICS & GYNECOLOGY
Payer: COMMERCIAL

## 2019-06-04 ENCOUNTER — HOSPITAL ENCOUNTER (OUTPATIENT)
Facility: MEDICAL CENTER | Age: 29
End: 2019-06-04
Attending: OBSTETRICS & GYNECOLOGY | Admitting: OBSTETRICS & GYNECOLOGY
Payer: COMMERCIAL

## 2019-06-04 VITALS
RESPIRATION RATE: 16 BRPM | HEART RATE: 75 BPM | WEIGHT: 202 LBS | SYSTOLIC BLOOD PRESSURE: 118 MMHG | HEIGHT: 66 IN | DIASTOLIC BLOOD PRESSURE: 60 MMHG | TEMPERATURE: 98.4 F | BODY MASS INDEX: 32.47 KG/M2

## 2019-06-04 PROCEDURE — 59025 FETAL NON-STRESS TEST: CPT

## 2019-06-04 PROCEDURE — 59025 FETAL NON-STRESS TEST: CPT | Mod: 26 | Performed by: OBSTETRICS & GYNECOLOGY

## 2019-06-04 PROCEDURE — 700101 HCHG RX REV CODE 250: Performed by: OBSTETRICS & GYNECOLOGY

## 2019-06-04 RX ADMIN — DINOPROSTONE 5 MG: 20 SUPPOSITORY VAGINAL at 21:53

## 2019-06-05 ENCOUNTER — APPOINTMENT (OUTPATIENT)
Dept: OBGYN | Facility: MEDICAL CENTER | Age: 29
End: 2019-06-05
Attending: OBSTETRICS & GYNECOLOGY
Payer: COMMERCIAL

## 2019-06-05 ENCOUNTER — HOSPITAL ENCOUNTER (INPATIENT)
Facility: MEDICAL CENTER | Age: 29
LOS: 3 days | End: 2019-06-08
Attending: OBSTETRICS & GYNECOLOGY | Admitting: OBSTETRICS & GYNECOLOGY
Payer: COMMERCIAL

## 2019-06-05 ENCOUNTER — ANESTHESIA (OUTPATIENT)
Dept: OBGYN | Facility: MEDICAL CENTER | Age: 29
End: 2019-06-05
Payer: COMMERCIAL

## 2019-06-05 ENCOUNTER — ANESTHESIA EVENT (OUTPATIENT)
Dept: OBGYN | Facility: MEDICAL CENTER | Age: 29
End: 2019-06-05
Payer: COMMERCIAL

## 2019-06-05 LAB
BASOPHILS # BLD AUTO: 0.2 % (ref 0–1.8)
BASOPHILS # BLD: 0.02 K/UL (ref 0–0.12)
EOSINOPHIL # BLD AUTO: 0.06 K/UL (ref 0–0.51)
EOSINOPHIL NFR BLD: 0.7 % (ref 0–6.9)
ERYTHROCYTE [DISTWIDTH] IN BLOOD BY AUTOMATED COUNT: 47.3 FL (ref 35.9–50)
HCT VFR BLD AUTO: 37.3 % (ref 37–47)
HGB BLD-MCNC: 12.2 G/DL (ref 12–16)
HOLDING TUBE BB 8507: NORMAL
IMM GRANULOCYTES # BLD AUTO: 0.06 K/UL (ref 0–0.11)
IMM GRANULOCYTES NFR BLD AUTO: 0.7 % (ref 0–0.9)
LYMPHOCYTES # BLD AUTO: 2.54 K/UL (ref 1–4.8)
LYMPHOCYTES NFR BLD: 29.2 % (ref 22–41)
MCH RBC QN AUTO: 31.2 PG (ref 27–33)
MCHC RBC AUTO-ENTMCNC: 32.7 G/DL (ref 33.6–35)
MCV RBC AUTO: 95.4 FL (ref 81.4–97.8)
MONOCYTES # BLD AUTO: 0.63 K/UL (ref 0–0.85)
MONOCYTES NFR BLD AUTO: 7.2 % (ref 0–13.4)
NEUTROPHILS # BLD AUTO: 5.4 K/UL (ref 2–7.15)
NEUTROPHILS NFR BLD: 62 % (ref 44–72)
NRBC # BLD AUTO: 0 K/UL
NRBC BLD-RTO: 0 /100 WBC
PLATELET # BLD AUTO: 174 K/UL (ref 164–446)
PMV BLD AUTO: 12.9 FL (ref 9–12.9)
RBC # BLD AUTO: 3.91 M/UL (ref 4.2–5.4)
WBC # BLD AUTO: 8.7 K/UL (ref 4.8–10.8)

## 2019-06-05 PROCEDURE — 700111 HCHG RX REV CODE 636 W/ 250 OVERRIDE (IP)

## 2019-06-05 PROCEDURE — 3E033VJ INTRODUCTION OF OTHER HORMONE INTO PERIPHERAL VEIN, PERCUTANEOUS APPROACH: ICD-10-PCS | Performed by: OBSTETRICS & GYNECOLOGY

## 2019-06-05 PROCEDURE — 700105 HCHG RX REV CODE 258: Performed by: OBSTETRICS & GYNECOLOGY

## 2019-06-05 PROCEDURE — 770002 HCHG ROOM/CARE - OB PRIVATE (112)

## 2019-06-05 PROCEDURE — 10907ZC DRAINAGE OF AMNIOTIC FLUID, THERAPEUTIC FROM PRODUCTS OF CONCEPTION, VIA NATURAL OR ARTIFICIAL OPENING: ICD-10-PCS | Performed by: OBSTETRICS & GYNECOLOGY

## 2019-06-05 PROCEDURE — 700105 HCHG RX REV CODE 258: Performed by: ANESTHESIOLOGY

## 2019-06-05 PROCEDURE — 36415 COLL VENOUS BLD VENIPUNCTURE: CPT

## 2019-06-05 PROCEDURE — 85025 COMPLETE CBC W/AUTO DIFF WBC: CPT

## 2019-06-05 PROCEDURE — 700105 HCHG RX REV CODE 258

## 2019-06-05 PROCEDURE — 700111 HCHG RX REV CODE 636 W/ 250 OVERRIDE (IP): Performed by: OBSTETRICS & GYNECOLOGY

## 2019-06-05 PROCEDURE — 10H07YZ INSERTION OF OTHER DEVICE INTO PRODUCTS OF CONCEPTION, VIA NATURAL OR ARTIFICIAL OPENING: ICD-10-PCS | Performed by: OBSTETRICS & GYNECOLOGY

## 2019-06-05 RX ORDER — ACETAMINOPHEN 500 MG
1000 TABLET ORAL EVERY 6 HOURS PRN
Status: CANCELLED | OUTPATIENT
Start: 2019-06-05

## 2019-06-05 RX ORDER — SODIUM CHLORIDE, SODIUM LACTATE, POTASSIUM CHLORIDE, CALCIUM CHLORIDE 600; 310; 30; 20 MG/100ML; MG/100ML; MG/100ML; MG/100ML
INJECTION, SOLUTION INTRAVENOUS CONTINUOUS
Status: DISPENSED | OUTPATIENT
Start: 2019-06-05 | End: 2019-06-05

## 2019-06-05 RX ORDER — ALUMINA, MAGNESIA, AND SIMETHICONE 2400; 2400; 240 MG/30ML; MG/30ML; MG/30ML
30 SUSPENSION ORAL EVERY 6 HOURS PRN
Status: DISCONTINUED | OUTPATIENT
Start: 2019-06-05 | End: 2019-06-06 | Stop reason: HOSPADM

## 2019-06-05 RX ORDER — BISACODYL 10 MG
10 SUPPOSITORY, RECTAL RECTAL ONCE
Status: ACTIVE | OUTPATIENT
Start: 2019-06-05 | End: 2019-06-06

## 2019-06-05 RX ORDER — DEXTROSE, SODIUM CHLORIDE, SODIUM LACTATE, POTASSIUM CHLORIDE, AND CALCIUM CHLORIDE 5; .6; .31; .03; .02 G/100ML; G/100ML; G/100ML; G/100ML; G/100ML
INJECTION, SOLUTION INTRAVENOUS CONTINUOUS
Status: DISCONTINUED | OUTPATIENT
Start: 2019-06-05 | End: 2019-06-06

## 2019-06-05 RX ORDER — ONDANSETRON 2 MG/ML
4 INJECTION INTRAMUSCULAR; INTRAVENOUS EVERY 6 HOURS PRN
Status: CANCELLED | OUTPATIENT
Start: 2019-06-05

## 2019-06-05 RX ORDER — IBUPROFEN 800 MG/1
800 TABLET ORAL EVERY 8 HOURS PRN
Status: CANCELLED | OUTPATIENT
Start: 2019-06-05

## 2019-06-05 RX ORDER — ONDANSETRON 4 MG/1
4 TABLET, ORALLY DISINTEGRATING ORAL EVERY 6 HOURS PRN
Status: CANCELLED | OUTPATIENT
Start: 2019-06-05

## 2019-06-05 RX ORDER — SODIUM CHLORIDE, SODIUM LACTATE, POTASSIUM CHLORIDE, CALCIUM CHLORIDE 600; 310; 30; 20 MG/100ML; MG/100ML; MG/100ML; MG/100ML
INJECTION, SOLUTION INTRAVENOUS
Status: COMPLETED
Start: 2019-06-05 | End: 2019-06-05

## 2019-06-05 RX ORDER — SODIUM CHLORIDE, SODIUM LACTATE, POTASSIUM CHLORIDE, CALCIUM CHLORIDE 600; 310; 30; 20 MG/100ML; MG/100ML; MG/100ML; MG/100ML
INJECTION, SOLUTION INTRAVENOUS CONTINUOUS
Status: ACTIVE | OUTPATIENT
Start: 2019-06-05 | End: 2019-06-05

## 2019-06-05 RX ORDER — ACETAMINOPHEN 325 MG/1
650 TABLET ORAL EVERY 4 HOURS PRN
Status: CANCELLED | OUTPATIENT
Start: 2019-06-05

## 2019-06-05 RX ORDER — ENEMA 19; 7 G/133ML; G/133ML
1 ENEMA RECTAL PRN
Status: DISCONTINUED | OUTPATIENT
Start: 2019-06-05 | End: 2019-06-06 | Stop reason: HOSPADM

## 2019-06-05 RX ORDER — ROPIVACAINE HYDROCHLORIDE 2 MG/ML
INJECTION, SOLUTION EPIDURAL; INFILTRATION; PERINEURAL
Status: COMPLETED
Start: 2019-06-05 | End: 2019-06-05

## 2019-06-05 RX ADMIN — SODIUM CHLORIDE, POTASSIUM CHLORIDE, SODIUM LACTATE AND CALCIUM CHLORIDE 300 ML: 600; 310; 30; 20 INJECTION, SOLUTION INTRAVENOUS at 21:02

## 2019-06-05 RX ADMIN — SODIUM CHLORIDE, POTASSIUM CHLORIDE, SODIUM LACTATE AND CALCIUM CHLORIDE: 600; 310; 30; 20 INJECTION, SOLUTION INTRAVENOUS at 15:50

## 2019-06-05 RX ADMIN — SODIUM CHLORIDE, SODIUM LACTATE, POTASSIUM CHLORIDE, CALCIUM CHLORIDE AND DEXTROSE MONOHYDRATE: 5; 600; 310; 30; 20 INJECTION, SOLUTION INTRAVENOUS at 23:36

## 2019-06-05 RX ADMIN — SODIUM CHLORIDE, POTASSIUM CHLORIDE, SODIUM LACTATE AND CALCIUM CHLORIDE 1000 ML: 600; 310; 30; 20 INJECTION, SOLUTION INTRAVENOUS at 13:46

## 2019-06-05 RX ADMIN — SODIUM CHLORIDE, POTASSIUM CHLORIDE, SODIUM LACTATE AND CALCIUM CHLORIDE: 600; 310; 30; 20 INJECTION, SOLUTION INTRAVENOUS at 21:31

## 2019-06-05 RX ADMIN — SODIUM CHLORIDE, POTASSIUM CHLORIDE, SODIUM LACTATE AND CALCIUM CHLORIDE: 600; 310; 30; 20 INJECTION, SOLUTION INTRAVENOUS at 15:15

## 2019-06-05 RX ADMIN — Medication 2 MILLI-UNITS/MIN: at 15:35

## 2019-06-05 RX ADMIN — FENTANYL CITRATE 100 MCG: 50 INJECTION INTRAMUSCULAR; INTRAVENOUS at 13:44

## 2019-06-05 RX ADMIN — SODIUM CHLORIDE, SODIUM GLUCONATE, SODIUM ACETATE, POTASSIUM CHLORIDE AND MAGNESIUM CHLORIDE: 526; 502; 368; 37; 30 INJECTION, SOLUTION INTRAVENOUS at 15:35

## 2019-06-05 RX ADMIN — SODIUM CHLORIDE, POTASSIUM CHLORIDE, SODIUM LACTATE AND CALCIUM CHLORIDE: 600; 310; 30; 20 INJECTION, SOLUTION INTRAVENOUS at 21:20

## 2019-06-05 RX ADMIN — ROPIVACAINE HYDROCHLORIDE 100 ML: 2 INJECTION, SOLUTION EPIDURAL; INFILTRATION at 15:27

## 2019-06-05 ASSESSMENT — LIFESTYLE VARIABLES: EVER_SMOKED: NEVER

## 2019-06-05 ASSESSMENT — PATIENT HEALTH QUESTIONNAIRE - PHQ9
SUM OF ALL RESPONSES TO PHQ9 QUESTIONS 1 AND 2: 6
8. MOVING OR SPEAKING SO SLOWLY THAT OTHER PEOPLE COULD HAVE NOTICED. OR THE OPPOSITE, BEING SO FIGETY OR RESTLESS THAT YOU HAVE BEEN MOVING AROUND A LOT MORE THAN USUAL: NOT AT ALL
3. TROUBLE FALLING OR STAYING ASLEEP OR SLEEPING TOO MUCH: NEARLY EVERY DAY
9. THOUGHTS THAT YOU WOULD BE BETTER OFF DEAD, OR OF HURTING YOURSELF: NOT AT ALL
2. FEELING DOWN, DEPRESSED, IRRITABLE, OR HOPELESS: NEARLY EVERY DAY
SUM OF ALL RESPONSES TO PHQ QUESTIONS 1-9: 17
1. LITTLE INTEREST OR PLEASURE IN DOING THINGS: NEARLY EVERY DAY
6. FEELING BAD ABOUT YOURSELF - OR THAT YOU ARE A FAILURE OR HAVE LET YOURSELF OR YOUR FAMILY DOWN: NEARLY EVERY DAY
4. FEELING TIRED OR HAVING LITTLE ENERGY: NEARLY EVERY DAY
7. TROUBLE CONCENTRATING ON THINGS, SUCH AS READING THE NEWSPAPER OR WATCHING TELEVISION: NOT AT ALL
5. POOR APPETITE OR OVEREATING: MORE THAN HALF THE DAYS

## 2019-06-05 NOTE — PROGRESS NOTES
, EDC  = 40.6 weeks    Patient presents to L&D for scheduled OP gel.. Patient denies contractions, leaking of fluid or any vaginal bleeding. EFM and TOCO applied. Pt reports decreased fetal movement starting this morning. VSS.  -SVE /-3  -SVE 1/60/-3-soft  -Dr. Sutherland notified of SVE and reactive tracing, new order to d/c pt with labor precautions.  -Pt ambulated off unit in stable condition with d/c instructions in hand. All questions answered, pt given labor precautions.

## 2019-06-05 NOTE — PROGRESS NOTES
Dr Bower at bedside. SVE 1-2/70/-2. Cook Balloon and pitocin explained to pt. Pt states understanding and denies any questions. FOB at bedside.

## 2019-06-05 NOTE — DISCHARGE INSTRUCTIONS
General Instructions:  · If you think you are in labor, time contractions (lying on your left side) from the beginning of one contraction to the beginning of the next contraction for at least one hour.  · Increase fluid intake: you should consume 10-12 8 oz glasses of non-caffeinated fluid per day.  · Report any pressure or burning on urination to your physician.  · Monitor fetal movement: If you notice an absence or decrease in fetal movement, drink a large glass of water and rest on your side.  If there is no increase in movement, call your physician or go to the hospital for further evaluation.  · Report any sudden, sharp abdominal pain.  · Report any bleeding.  Spotting or pinkish discharge is normal after vaginal exam.  You may also spot after sexual intercourse.    Labor Instructions (37 - 39 weeks):  Call your physician or return to hospital if:  · You have regular contractions that get progressively closer, longer and stronger.  · Your water breaks (remember time and color).  · You have bleeding like a period.  · Your baby does not move enough to complete the daily kick counts (10 movements in 2 hours)  · Your baby moves much less often than on the days before or you have not felt your baby move all day.    Call at 0800 am for scheduled IOL.   [phone]    Other Instructions:  Please carefully review your entire AFTER VISIT SUMMARY document for all discharge instructions.

## 2019-06-05 NOTE — PROGRESS NOTES
NST per my read:  Indications: Post dated/outpatient cervical ripening    FHR baseline: 130s  Accelerations present without decelerations  Moderate FHR variability present  [place]: rare contractions noted  NST is reactive

## 2019-06-05 NOTE — ANESTHESIA PROCEDURE NOTES
Epidural Block  Performed by: CECIL MATTHEWS  Authorized by: CECIL MATTHEWS     Patient Location:  OB  Start Time:  6/5/2019 3:35 PM  End Time:  6/5/2019 3:46 PM  Reason for Block: labor analgesia    patient identified, IV checked, site marked, risks and benefits discussed, surgical consent, monitors and equipment checked, pre-op evaluation and timeout performed    Patient Position:  Sitting  Prep: ChloraPrep    Monitoring:  Blood pressure and continuous pulse oximetry  Approach:  Midline  Location:  L2-L3  Injection Technique:  COMFORT air  Skin infiltration:  Lidocaine  Strength:  1%  Dose:  3ml  Needle Type:  Tuohy  Needle Gauge:  17 G  Needle Length:  3.5 in  Loss of resistance::  6  Catheter at Skin Depth:  15  Test Dose Result:  Negative

## 2019-06-05 NOTE — PROGRESS NOTES
"OB H&P:    CC: IOL    HPI:  Ms. Leslie Loja is a 28 y.o.  @ 41w0d by lmp c/w pt reported 8wk US, also c/w 25wk US here for PD IOL.  S/p OP gel last night.    Contractions: No   Loss of fluid: No   Vaginal bleeding: No   Fetal movement: present      PNC with [facility], no complications.    Did have LSIL this pregnancy, plan had been for colpo but does not appear to have been done    PNL:  Rh+/-, RI, HIV neg, RPR NR, HBsAg NR, GC/CT neg/neg  GBS neg      ROS:  Const: denies fevers, general concerns  CV/resp: reports no concerns  GI: denies abd pain, GI concerns  : see HPI  Neuro: denies HA/vision changes    OB History    Para Term  AB Living   1             SAB TAB Ectopic Molar Multiple Live Births                    # Outcome Date GA Lbr [place]/2nd Weight Sex Delivery Anes PTL Lv   1 Current                   GYN: denies STIs, no cervical procedures; did have LSIL this pregnancy.    PMHx; denies  PSHx; denies    No current facility-administered medications on file prior to encounter.      Current Outpatient Prescriptions on File Prior to Encounter   Medication Sig Dispense Refill   • Prenatal MV-Min-Fe Fum-FA-DHA (PRENATAL 1 PO) Take  by mouth.           FamHx: denies    Social History     Denies tob/drugs EtOH    PE:  Vitals:    19 0925   BP: 122/76   Pulse: 67   Temp: 36.8 °C (98.3 °F)   TempSrc: Temporal   Weight: 91.6 kg (202 lb)   Height: 1.676 m (5' 6\")     gen: AAO, NAD  abd: soft, gravid, NT, [id number]  Ext: NT, tr edema    SVE: 1-2/70/-2 @ 1150  FHT: 125/mod variability/+ accels/ no decels  [place]: q4-6min    A/P: 28 y.o.  @ 41w0d by lmp c/w 8wk US (reportedly from [place]) here for postdates IOL  - will do cook balloon with pitocin for IOL  - FHT: cat I  - GBS neg  - not sure if wants epidural  - plans mirena for PP contraception.  - will need PP colpo for LSIL pap      Breanna Romero MD  [facility], Women's Health        Cook balloon placed easily @ 1315, " 80cc/60cc.  Will start pit    Breanna Romero MD  [facility], Women's Health

## 2019-06-05 NOTE — ANESTHESIA PREPROCEDURE EVALUATION
Relevant Problems   No relevant active problems       Physical Exam    Airway   Mallampati: II  TM distance: >3 FB  Neck ROM: full       Cardiovascular - normal exam  Rhythm: regular  Rate: normal     Dental - normal exam         Pulmonary    Abdominal    Neurological - normal exam                 Anesthesia Plan    ASA 2       Plan - epidural   Neuraxial block will be labor analgesia      Plan Factors:   Patient was not previously instructed to abstain from smoking on day of procedure.  Patient did not smoke on day of procedure.              Informed Consent:    Anesthetic plan and risks discussed with patient.    Use of blood products discussed with: patient whom.

## 2019-06-06 LAB
BASOPHILS # BLD AUTO: 0.3 % (ref 0–1.8)
BASOPHILS # BLD: 0.04 K/UL (ref 0–0.12)
EOSINOPHIL # BLD AUTO: 0.01 K/UL (ref 0–0.51)
EOSINOPHIL NFR BLD: 0.1 % (ref 0–6.9)
ERYTHROCYTE [DISTWIDTH] IN BLOOD BY AUTOMATED COUNT: 45.9 FL (ref 35.9–50)
ERYTHROCYTE [DISTWIDTH] IN BLOOD BY AUTOMATED COUNT: 47.7 FL (ref 35.9–50)
HCT VFR BLD AUTO: 30.1 % (ref 37–47)
HCT VFR BLD AUTO: 33.9 % (ref 37–47)
HGB BLD-MCNC: 10.1 G/DL (ref 12–16)
HGB BLD-MCNC: 10.7 G/DL (ref 12–16)
IMM GRANULOCYTES # BLD AUTO: 0.1 K/UL (ref 0–0.11)
IMM GRANULOCYTES NFR BLD AUTO: 0.6 % (ref 0–0.9)
LYMPHOCYTES # BLD AUTO: 1.32 K/UL (ref 1–4.8)
LYMPHOCYTES NFR BLD: 8.3 % (ref 22–41)
MCH RBC QN AUTO: 31.1 PG (ref 27–33)
MCH RBC QN AUTO: 32 PG (ref 27–33)
MCHC RBC AUTO-ENTMCNC: 31.6 G/DL (ref 33.6–35)
MCHC RBC AUTO-ENTMCNC: 33.6 G/DL (ref 33.6–35)
MCV RBC AUTO: 95.3 FL (ref 81.4–97.8)
MCV RBC AUTO: 98.5 FL (ref 81.4–97.8)
MONOCYTES # BLD AUTO: 0.61 K/UL (ref 0–0.85)
MONOCYTES NFR BLD AUTO: 3.8 % (ref 0–13.4)
NEUTROPHILS # BLD AUTO: 13.85 K/UL (ref 2–7.15)
NEUTROPHILS NFR BLD: 86.9 % (ref 44–72)
NRBC # BLD AUTO: 0 K/UL
NRBC BLD-RTO: 0 /100 WBC
PLATELET # BLD AUTO: 137 K/UL (ref 164–446)
PLATELET # BLD AUTO: 143 K/UL (ref 164–446)
PMV BLD AUTO: 12 FL (ref 9–12.9)
PMV BLD AUTO: 12.4 FL (ref 9–12.9)
RBC # BLD AUTO: 3.16 M/UL (ref 4.2–5.4)
RBC # BLD AUTO: 3.44 M/UL (ref 4.2–5.4)
WBC # BLD AUTO: 15.9 K/UL (ref 4.8–10.8)
WBC # BLD AUTO: 16.3 K/UL (ref 4.8–10.8)

## 2019-06-06 PROCEDURE — 700105 HCHG RX REV CODE 258

## 2019-06-06 PROCEDURE — 700102 HCHG RX REV CODE 250 W/ 637 OVERRIDE(OP): Performed by: OBSTETRICS & GYNECOLOGY

## 2019-06-06 PROCEDURE — 700111 HCHG RX REV CODE 636 W/ 250 OVERRIDE (IP): Performed by: ANESTHESIOLOGY

## 2019-06-06 PROCEDURE — 36415 COLL VENOUS BLD VENIPUNCTURE: CPT

## 2019-06-06 PROCEDURE — 85027 COMPLETE CBC AUTOMATED: CPT

## 2019-06-06 PROCEDURE — 59510 CESAREAN DELIVERY: CPT | Performed by: OBSTETRICS & GYNECOLOGY

## 2019-06-06 PROCEDURE — 305385 HCHG SURGICAL SERVICES 1/4 HOUR: Performed by: OBSTETRICS & GYNECOLOGY

## 2019-06-06 PROCEDURE — 85025 COMPLETE CBC W/AUTO DIFF WBC: CPT

## 2019-06-06 PROCEDURE — 700102 HCHG RX REV CODE 250 W/ 637 OVERRIDE(OP)

## 2019-06-06 PROCEDURE — A9270 NON-COVERED ITEM OR SERVICE: HCPCS | Performed by: OBSTETRICS & GYNECOLOGY

## 2019-06-06 PROCEDURE — 306828 HCHG ANES-TIME GENERAL: Performed by: OBSTETRICS & GYNECOLOGY

## 2019-06-06 PROCEDURE — 304964 HCHG RECOVERY ROOM TIME 1HR: Performed by: OBSTETRICS & GYNECOLOGY

## 2019-06-06 PROCEDURE — 302135 SEQUENTIAL COMPRESSION MACHINE: Performed by: OBSTETRICS & GYNECOLOGY

## 2019-06-06 PROCEDURE — 700101 HCHG RX REV CODE 250: Performed by: ANESTHESIOLOGY

## 2019-06-06 PROCEDURE — 59514 CESAREAN DELIVERY ONLY: CPT | Mod: AS | Performed by: NURSE PRACTITIONER

## 2019-06-06 PROCEDURE — 770002 HCHG ROOM/CARE - OB PRIVATE (112)

## 2019-06-06 PROCEDURE — 700111 HCHG RX REV CODE 636 W/ 250 OVERRIDE (IP)

## 2019-06-06 RX ORDER — ONDANSETRON 2 MG/ML
4 INJECTION INTRAMUSCULAR; INTRAVENOUS EVERY 6 HOURS PRN
Status: DISCONTINUED | OUTPATIENT
Start: 2019-06-06 | End: 2019-06-08 | Stop reason: HOSPADM

## 2019-06-06 RX ORDER — METOCLOPRAMIDE HYDROCHLORIDE 5 MG/ML
10 INJECTION INTRAMUSCULAR; INTRAVENOUS ONCE
Status: CANCELLED | OUTPATIENT
Start: 2019-06-06 | End: 2019-06-06

## 2019-06-06 RX ORDER — ONDANSETRON 2 MG/ML
INJECTION INTRAMUSCULAR; INTRAVENOUS PRN
Status: DISCONTINUED | OUTPATIENT
Start: 2019-06-06 | End: 2019-06-06 | Stop reason: SURG

## 2019-06-06 RX ORDER — ACETAMINOPHEN 325 MG/1
650 TABLET ORAL EVERY 4 HOURS PRN
Status: DISCONTINUED | OUTPATIENT
Start: 2019-06-06 | End: 2019-06-08 | Stop reason: HOSPADM

## 2019-06-06 RX ORDER — SODIUM CHLORIDE, SODIUM LACTATE, POTASSIUM CHLORIDE, AND CALCIUM CHLORIDE .6; .31; .03; .02 G/100ML; G/100ML; G/100ML; G/100ML
1000 INJECTION, SOLUTION INTRAVENOUS
Status: DISCONTINUED | OUTPATIENT
Start: 2019-06-06 | End: 2019-06-06 | Stop reason: HOSPADM

## 2019-06-06 RX ORDER — MORPHINE SULFATE 4 MG/ML
4 INJECTION, SOLUTION INTRAMUSCULAR; INTRAVENOUS
Status: DISCONTINUED | OUTPATIENT
Start: 2019-06-06 | End: 2019-06-08 | Stop reason: HOSPADM

## 2019-06-06 RX ORDER — CEFAZOLIN SODIUM 1 G/3ML
1 INJECTION, POWDER, FOR SOLUTION INTRAMUSCULAR; INTRAVENOUS ONCE
Status: DISCONTINUED | OUTPATIENT
Start: 2019-06-06 | End: 2019-06-06

## 2019-06-06 RX ORDER — HYDROMORPHONE HYDROCHLORIDE 1 MG/ML
0.1 INJECTION, SOLUTION INTRAMUSCULAR; INTRAVENOUS; SUBCUTANEOUS
Status: CANCELLED | OUTPATIENT
Start: 2019-06-06

## 2019-06-06 RX ORDER — SODIUM CHLORIDE, SODIUM GLUCONATE, SODIUM ACETATE, POTASSIUM CHLORIDE AND MAGNESIUM CHLORIDE 526; 502; 368; 37; 30 MG/100ML; MG/100ML; MG/100ML; MG/100ML; MG/100ML
1500 INJECTION, SOLUTION INTRAVENOUS ONCE
Status: CANCELLED | OUTPATIENT
Start: 2019-06-06 | End: 2019-06-06

## 2019-06-06 RX ORDER — LIDOCAINE HCL/EPINEPHRINE/PF 2%-1:200K
VIAL (ML) INJECTION PRN
Status: DISCONTINUED | OUTPATIENT
Start: 2019-06-06 | End: 2019-06-06 | Stop reason: SURG

## 2019-06-06 RX ORDER — OXYCODONE HYDROCHLORIDE AND ACETAMINOPHEN 5; 325 MG/1; MG/1
1 TABLET ORAL
Status: CANCELLED | OUTPATIENT
Start: 2019-06-06

## 2019-06-06 RX ORDER — MISOPROSTOL 200 UG/1
800 TABLET ORAL
Status: DISCONTINUED | OUTPATIENT
Start: 2019-06-06 | End: 2019-06-06

## 2019-06-06 RX ORDER — SODIUM CHLORIDE, SODIUM LACTATE, POTASSIUM CHLORIDE, CALCIUM CHLORIDE 600; 310; 30; 20 MG/100ML; MG/100ML; MG/100ML; MG/100ML
INJECTION, SOLUTION INTRAVENOUS PRN
Status: DISCONTINUED | OUTPATIENT
Start: 2019-06-06 | End: 2019-06-08 | Stop reason: HOSPADM

## 2019-06-06 RX ORDER — ROPIVACAINE HYDROCHLORIDE 2 MG/ML
INJECTION, SOLUTION EPIDURAL; INFILTRATION; PERINEURAL CONTINUOUS
Status: DISCONTINUED | OUTPATIENT
Start: 2019-06-06 | End: 2019-06-06

## 2019-06-06 RX ORDER — OXYCODONE AND ACETAMINOPHEN 10; 325 MG/1; MG/1
1 TABLET ORAL EVERY 4 HOURS PRN
Status: DISCONTINUED | OUTPATIENT
Start: 2019-06-06 | End: 2019-06-08 | Stop reason: HOSPADM

## 2019-06-06 RX ORDER — HALOPERIDOL 5 MG/ML
1 INJECTION INTRAMUSCULAR
Status: CANCELLED | OUTPATIENT
Start: 2019-06-06

## 2019-06-06 RX ORDER — IBUPROFEN 800 MG/1
800 TABLET ORAL EVERY 8 HOURS PRN
Status: DISCONTINUED | OUTPATIENT
Start: 2019-06-06 | End: 2019-06-08 | Stop reason: HOSPADM

## 2019-06-06 RX ORDER — ONDANSETRON 2 MG/ML
4 INJECTION INTRAMUSCULAR; INTRAVENOUS
Status: CANCELLED | OUTPATIENT
Start: 2019-06-06

## 2019-06-06 RX ORDER — DIPHENHYDRAMINE HYDROCHLORIDE 50 MG/ML
12.5 INJECTION INTRAMUSCULAR; INTRAVENOUS
Status: CANCELLED | OUTPATIENT
Start: 2019-06-06

## 2019-06-06 RX ORDER — DOCUSATE SODIUM 100 MG/1
100 CAPSULE, LIQUID FILLED ORAL 2 TIMES DAILY PRN
Status: DISCONTINUED | OUTPATIENT
Start: 2019-06-06 | End: 2019-06-08 | Stop reason: HOSPADM

## 2019-06-06 RX ORDER — OXYCODONE HYDROCHLORIDE AND ACETAMINOPHEN 5; 325 MG/1; MG/1
1 TABLET ORAL EVERY 4 HOURS PRN
Status: DISCONTINUED | OUTPATIENT
Start: 2019-06-06 | End: 2019-06-08 | Stop reason: HOSPADM

## 2019-06-06 RX ORDER — SODIUM CHLORIDE, SODIUM GLUCONATE, SODIUM ACETATE, POTASSIUM CHLORIDE AND MAGNESIUM CHLORIDE 526; 502; 368; 37; 30 MG/100ML; MG/100ML; MG/100ML; MG/100ML; MG/100ML
INJECTION, SOLUTION INTRAVENOUS
Status: COMPLETED
Start: 2019-06-06 | End: 2019-06-06

## 2019-06-06 RX ORDER — SODIUM CHLORIDE, SODIUM GLUCONATE, SODIUM ACETATE, POTASSIUM CHLORIDE AND MAGNESIUM CHLORIDE 526; 502; 368; 37; 30 MG/100ML; MG/100ML; MG/100ML; MG/100ML; MG/100ML
INJECTION, SOLUTION INTRAVENOUS
Status: DISCONTINUED | OUTPATIENT
Start: 2019-06-05 | End: 2019-06-06 | Stop reason: SURG

## 2019-06-06 RX ORDER — OXYCODONE HYDROCHLORIDE AND ACETAMINOPHEN 5; 325 MG/1; MG/1
2 TABLET ORAL
Status: CANCELLED | OUTPATIENT
Start: 2019-06-06

## 2019-06-06 RX ORDER — CITRIC ACID/SODIUM CITRATE 334-500MG
SOLUTION, ORAL ORAL
Status: COMPLETED
Start: 2019-06-06 | End: 2019-06-06

## 2019-06-06 RX ORDER — SODIUM CHLORIDE, SODIUM LACTATE, POTASSIUM CHLORIDE, AND CALCIUM CHLORIDE .6; .31; .03; .02 G/100ML; G/100ML; G/100ML; G/100ML
250 INJECTION, SOLUTION INTRAVENOUS PRN
Status: DISCONTINUED | OUTPATIENT
Start: 2019-06-06 | End: 2019-06-06 | Stop reason: HOSPADM

## 2019-06-06 RX ORDER — ROPIVACAINE HYDROCHLORIDE 2 MG/ML
INJECTION, SOLUTION EPIDURAL; INFILTRATION; PERINEURAL
Status: COMPLETED
Start: 2019-06-06 | End: 2019-06-06

## 2019-06-06 RX ORDER — SIMETHICONE 80 MG
80 TABLET,CHEWABLE ORAL 4 TIMES DAILY PRN
Status: DISCONTINUED | OUTPATIENT
Start: 2019-06-06 | End: 2019-06-08 | Stop reason: HOSPADM

## 2019-06-06 RX ORDER — DIPHENHYDRAMINE HYDROCHLORIDE 50 MG/ML
25 INJECTION INTRAMUSCULAR; INTRAVENOUS EVERY 6 HOURS PRN
Status: DISCONTINUED | OUTPATIENT
Start: 2019-06-06 | End: 2019-06-08 | Stop reason: HOSPADM

## 2019-06-06 RX ORDER — CITRIC ACID/SODIUM CITRATE 334-500MG
30 SOLUTION, ORAL ORAL ONCE
Status: CANCELLED | OUTPATIENT
Start: 2019-06-06 | End: 2019-06-06

## 2019-06-06 RX ORDER — HYDROMORPHONE HYDROCHLORIDE 1 MG/ML
0.2 INJECTION, SOLUTION INTRAMUSCULAR; INTRAVENOUS; SUBCUTANEOUS
Status: CANCELLED | OUTPATIENT
Start: 2019-06-06

## 2019-06-06 RX ORDER — KETOROLAC TROMETHAMINE 30 MG/ML
INJECTION, SOLUTION INTRAMUSCULAR; INTRAVENOUS PRN
Status: DISCONTINUED | OUTPATIENT
Start: 2019-06-06 | End: 2019-06-06 | Stop reason: SURG

## 2019-06-06 RX ORDER — METOCLOPRAMIDE HYDROCHLORIDE 5 MG/ML
INJECTION INTRAMUSCULAR; INTRAVENOUS
Status: COMPLETED
Start: 2019-06-06 | End: 2019-06-06

## 2019-06-06 RX ORDER — CEFAZOLIN SODIUM 1 G/3ML
INJECTION, POWDER, FOR SOLUTION INTRAMUSCULAR; INTRAVENOUS PRN
Status: DISCONTINUED | OUTPATIENT
Start: 2019-06-06 | End: 2019-06-06 | Stop reason: SURG

## 2019-06-06 RX ORDER — SODIUM CHLORIDE, SODIUM LACTATE, POTASSIUM CHLORIDE, CALCIUM CHLORIDE 600; 310; 30; 20 MG/100ML; MG/100ML; MG/100ML; MG/100ML
INJECTION, SOLUTION INTRAVENOUS CONTINUOUS
Status: DISCONTINUED | OUTPATIENT
Start: 2019-06-06 | End: 2019-06-06

## 2019-06-06 RX ORDER — MISOPROSTOL 200 UG/1
800 TABLET ORAL
Status: DISCONTINUED | OUTPATIENT
Start: 2019-06-06 | End: 2019-06-08 | Stop reason: HOSPADM

## 2019-06-06 RX ORDER — SODIUM CHLORIDE, SODIUM LACTATE, POTASSIUM CHLORIDE, CALCIUM CHLORIDE 600; 310; 30; 20 MG/100ML; MG/100ML; MG/100ML; MG/100ML
INJECTION, SOLUTION INTRAVENOUS CONTINUOUS
Status: CANCELLED | OUTPATIENT
Start: 2019-06-06

## 2019-06-06 RX ORDER — DEXAMETHASONE SODIUM PHOSPHATE 4 MG/ML
INJECTION, SOLUTION INTRA-ARTICULAR; INTRALESIONAL; INTRAMUSCULAR; INTRAVENOUS; SOFT TISSUE PRN
Status: DISCONTINUED | OUTPATIENT
Start: 2019-06-06 | End: 2019-06-06 | Stop reason: SURG

## 2019-06-06 RX ORDER — ONDANSETRON 4 MG/1
4 TABLET, ORALLY DISINTEGRATING ORAL EVERY 6 HOURS PRN
Status: DISCONTINUED | OUTPATIENT
Start: 2019-06-06 | End: 2019-06-08 | Stop reason: HOSPADM

## 2019-06-06 RX ORDER — DIPHENHYDRAMINE HCL 25 MG
25 TABLET ORAL EVERY 6 HOURS PRN
Status: DISCONTINUED | OUTPATIENT
Start: 2019-06-06 | End: 2019-06-08 | Stop reason: HOSPADM

## 2019-06-06 RX ORDER — HYDROMORPHONE HYDROCHLORIDE 1 MG/ML
0.4 INJECTION, SOLUTION INTRAMUSCULAR; INTRAVENOUS; SUBCUTANEOUS
Status: CANCELLED | OUTPATIENT
Start: 2019-06-06

## 2019-06-06 RX ADMIN — LIDOCAINE HYDROCHLORIDE,EPINEPHRINE BITARTRATE 10 ML: 20; .005 INJECTION, SOLUTION EPIDURAL; INFILTRATION; INTRACAUDAL; PERINEURAL at 03:01

## 2019-06-06 RX ADMIN — SODIUM CHLORIDE, SODIUM GLUCONATE, SODIUM ACETATE, POTASSIUM CHLORIDE AND MAGNESIUM CHLORIDE 1000 ML: 526; 502; 368; 37; 30 INJECTION, SOLUTION INTRAVENOUS at 02:33

## 2019-06-06 RX ADMIN — Medication 20 UNITS: at 04:59

## 2019-06-06 RX ADMIN — LIDOCAINE HYDROCHLORIDE,EPINEPHRINE BITARTRATE 10 ML: 20; .005 INJECTION, SOLUTION EPIDURAL; INFILTRATION; INTRACAUDAL; PERINEURAL at 03:14

## 2019-06-06 RX ADMIN — IBUPROFEN 800 MG: 800 TABLET ORAL at 16:35

## 2019-06-06 RX ADMIN — OXYCODONE HYDROCHLORIDE AND ACETAMINOPHEN 1 TABLET: 10; 325 TABLET ORAL at 22:50

## 2019-06-06 RX ADMIN — ONDANSETRON 4 MG: 2 INJECTION INTRAMUSCULAR; INTRAVENOUS at 03:12

## 2019-06-06 RX ADMIN — FAMOTIDINE 20 MG: 10 INJECTION INTRAVENOUS at 02:33

## 2019-06-06 RX ADMIN — CEFAZOLIN 2 G: 330 INJECTION, POWDER, FOR SOLUTION INTRAMUSCULAR; INTRAVENOUS at 03:06

## 2019-06-06 RX ADMIN — OXYCODONE HYDROCHLORIDE AND ACETAMINOPHEN 1 TABLET: 10; 325 TABLET ORAL at 12:28

## 2019-06-06 RX ADMIN — OXYCODONE HYDROCHLORIDE AND ACETAMINOPHEN 1 TABLET: 10; 325 TABLET ORAL at 16:35

## 2019-06-06 RX ADMIN — ROPIVACAINE HYDROCHLORIDE 100 ML: 2 INJECTION, SOLUTION EPIDURAL; INFILTRATION at 00:25

## 2019-06-06 RX ADMIN — DEXAMETHASONE SODIUM PHOSPHATE 8 MG: 4 INJECTION, SOLUTION INTRA-ARTICULAR; INTRALESIONAL; INTRAMUSCULAR; INTRAVENOUS; SOFT TISSUE at 03:12

## 2019-06-06 RX ADMIN — OXYTOCIN 20 ML: 10 INJECTION, SOLUTION INTRAMUSCULAR; INTRAVENOUS at 03:19

## 2019-06-06 RX ADMIN — OXYCODONE HYDROCHLORIDE AND ACETAMINOPHEN 1 TABLET: 5; 325 TABLET ORAL at 07:35

## 2019-06-06 RX ADMIN — METOCLOPRAMIDE 10 MG: 5 INJECTION, SOLUTION INTRAMUSCULAR; INTRAVENOUS at 02:33

## 2019-06-06 RX ADMIN — KETOROLAC TROMETHAMINE 30 MG: 30 INJECTION, SOLUTION INTRAMUSCULAR at 03:59

## 2019-06-06 RX ADMIN — IBUPROFEN 800 MG: 800 TABLET ORAL at 07:35

## 2019-06-06 RX ADMIN — SODIUM CITRATE AND CITRIC ACID MONOHYDRATE 30 ML: 500; 334 SOLUTION ORAL at 02:33

## 2019-06-06 ASSESSMENT — PATIENT HEALTH QUESTIONNAIRE - PHQ9
3. TROUBLE FALLING OR STAYING ASLEEP OR SLEEPING TOO MUCH: NOT AT ALL
SUM OF ALL RESPONSES TO PHQ9 QUESTIONS 1 AND 2: 0
5. POOR APPETITE OR OVEREATING: NOT AT ALL
SUM OF ALL RESPONSES TO PHQ QUESTIONS 1-9: 0
6. FEELING BAD ABOUT YOURSELF - OR THAT YOU ARE A FAILURE OR HAVE LET YOURSELF OR YOUR FAMILY DOWN: NOT AL ALL
9. THOUGHTS THAT YOU WOULD BE BETTER OFF DEAD, OR OF HURTING YOURSELF: NOT AT ALL
1. LITTLE INTEREST OR PLEASURE IN DOING THINGS: NOT AT ALL
7. TROUBLE CONCENTRATING ON THINGS, SUCH AS READING THE NEWSPAPER OR WATCHING TELEVISION: NOT AT ALL
2. FEELING DOWN, DEPRESSED, IRRITABLE, OR HOPELESS: NOT AT ALL
8. MOVING OR SPEAKING SO SLOWLY THAT OTHER PEOPLE COULD HAVE NOTICED. OR THE OPPOSITE, BEING SO FIGETY OR RESTLESS THAT YOU HAVE BEEN MOVING AROUND A LOT MORE THAN USUAL: NOT AT ALL
4. FEELING TIRED OR HAVING LITTLE ENERGY: NOT AT ALL

## 2019-06-06 ASSESSMENT — PAIN SCALES - GENERAL: PAIN_LEVEL: 0

## 2019-06-06 NOTE — PROGRESS NOTES
Labor Progress Note:      S:  Pt comfortable w/ epidural.    Patient Vitals for the past 4 hrs:   BP Temp Temp src Pulse SpO2   19 1553 - 36.9 °C (98.5 °F) Temporal - -   19 1550 125/69 - - 75 -   19 1544 124/64 - - 80 -   19 1541 119/67 - - 79 -   19 1535 130/68 - - 75 -   19 1529 122/64 - - 84 -   19 1526 - - - 75 99 %   19 1521 - - - 87 100 %   19 1512 127/57 - - 65 -     Balloon pulled out with gentle traction  SVE: /-1, AROM clear @ 1725    FHT: 125/mod variability/+ accels/no decels  toco: 2-3 ctx      A/P: 28 y.o.  @ 41w0d admitted for PD IOL  - labor: s/p joseline, on pitocin, currently @ 6mUnits/min   Still latent, appears to be transitioning to active labor  - FHT: cat I  - GBS: neg  -  Comfortable w/ epidural.      Breanna Romero MD  [facility], Women's Health

## 2019-06-06 NOTE — ANESTHESIA TIME REPORT
Anesthesia Start and Stop Event Times     Date Time Event    6/5/2019 1535 Anesthesia Start    6/6/2019 0400 Anesthesia Stop        Responsible Staff  06/05/19 to 06/06/19    Name Role Begin End    Benito Serna M.D. Anesth 1535 0400        Preop Diagnosis (Free Text):  Pre-op Diagnosis     IUP 41.1, Failure to progress        Preop Diagnosis (Codes):  Diagnosis Information     Diagnosis Code(s):         Post op Diagnosis  Failure to progress in labor      Premium Reason  A. 3PM - 7AM    Comments:

## 2019-06-06 NOTE — ANESTHESIA POSTPROCEDURE EVALUATION
Patient: Leslie Loja    Procedure Summary     Date:  19 Room / Location:  [facility] OR  / LABOR AND DELIVERY    Anesthesia Start:  1535 Anesthesia Stop:  19 0400    Procedure:   SECTION, PRIMARY (Abdomen) Diagnosis:  (IUP 41.1, Failure to progress)    Surgeon:  Vahe Lindo M.D. Responsible Provider:  Benito Serna M.D.    Anesthesia Type:  epidural ASA Status:  2          Final Anesthesia Type: epidural  Last vitals  BP   Blood Pressure: 122/73    Temp   37.5 °C (99.5 °F)    Pulse   Pulse: 72   Resp   16    SpO2   99 %      Anesthesia Post Evaluation    Patient location during evaluation: PACU  Patient participation: complete - patient participated  Level of consciousness: awake and alert  Pain score: 0    Airway patency: patent  Anesthetic complications: no  Cardiovascular status: adequate  Respiratory status: acceptable  Hydration status: acceptable    PONV: none

## 2019-06-06 NOTE — L&D DELIVERY NOTE
OB  Delivery Note    2019  Leslie Loja  28 y.o.    Review the Delivery Report for details.     Gestational Age: 41w1d  /Para:   Labor Complications:     Estimated Blood Loss:   IO Blood Loss  19 0210 - 19 0421    Est. Blood Loss Anesthesia 600 mL    Total  600 mL        Delivery Type: , Low Transverse   ROM to Delivery Time: 9h 54m   Sex: Male  [place] Weight: 2.99 kg (6 lb 9.5 oz)    1 Minute 5 Minute 10 Minute   Apgar Totals: 8    9             Details    Pre-Op Diagnosis: 1. Intrauterine pregnancy at 41w1d  2. Post Term    Delivery Justification Patient was admitted to Labor and Delivery at 41w1d for induction of labor due to postdates   Post-Op Diagnosis: 1. Persistent OP   Indications:  Failure to Progress    Procedure: Primary  , Low Transverse  via Low Transverse  incision   Staff Surgeon(s):  Vahe Lindo M.D.  Circulator: Jayleen Young R.N.  Scrub Person: Lidia East  First Assist: Mahi Cody R.N.  L&D Baby  Nurse: Anisa Baugh R.N.   Anesthesia: Epidural    Findings: Male infant delivered, weighing 2.99 kg (6 lb 9.5 oz) . Normal uterus, tubes, and ovaries.     Informed Consent:  The risks, benefits, complications, and alternatives were discussed with the patient. The patient understood that the risks of  section include, but are not limited to: injury to nearby structures or organs, infection, blood loss and possible need for transfusion, and potential need for more surgery including hysterectomy. The patient stated understanding and desired to proceed. All questions were answered. The site of surgery was properly noted and marked. The patient was identified as Leslie Loja and the procedure verified as a  delivery. A Time Out was held and the above information confirmed.    Procedure Details:  After adequate Epidural  anesthesia was ascertained, the patient was  prepped and draped in a normal supine position with a wedge under the right hip.  A pfannensteil incision was made.  The incision was taken down to the fascia, which was incised medial to lateral.  The rectus muscles were dissected off the rectus sheath.  There was separation of the rectus sheath superiorly and the peritoneum was entered atraumatically, extended superiorly and inferiorly.  An Angel O retractor was placed. The lower uterine segment was identified.  A low transverse incision was made in the uterus.  It was then extended digitally and the membranes were previously ruptured with clear fluid.  The infant was born in a vertex position.  It was a Living  Male infant.  Placenta removal: Manual Removal . Placental disposition: discarded . The uterus was repaired in situ with an angel-o retractor, cleansed of all clots and membranes.  The lower segment was dilated for lochia egress.  Attention was made towards closing the uterus in a 2-layer fashion with #1 Chromic suture ligature in a running interlocking stitch with hemostatis assured.  The gutters were cleansed of all clots.  Tubal ligation was not performed.  Normal tubes and ovaries were noted.  The fascia was closed with 0 Vicryl going right to left, left to right, crossing in the midline with a running interlocking stitch.  The subcutaneous tissues was copiously irrigated.  Small capillary bleeders individually coagulated.  The subcutaneous tissues were approximated with interrupted 3-0 Vicryl and the skin with subcuticular suture with 4-0 Vicryl.   Steri Strip/Mypelex / tegaderm dressing     The patient tolerated the procedure well. Sponge, instrument, and needle counts were correct and the patient was taken to the recovery room in good and stable condition.    Vahe Lindo

## 2019-06-06 NOTE — CARE PLAN
Problem: Knowledge Deficit  Goal: Patient/Support person demonstrates understanding regarding the progression of labor, available options and participates in decision-making process  Outcome: PROGRESSING AS EXPECTED  G1 admitted for IOL for post dates.  POC discussed.   Questions encouraged/answered.  Pt and FOB verbalize understanding.       Problem: Psychosocial needs  Goal: Spiritual needs incorporated in hospitalization  Outcome: PROGRESSING AS EXPECTED  Pt experiencing depression.  Pt encouraged to verbalize feelings.   and MD notified.  Pt denies SI.  FOB aware and supportive.     Problem: Pain  Goal: Alleviation of Pain or a reduction in pain to the patient's comfort goal  Outcome: PROGRESSING AS EXPECTED  Pt received epidural upon request.  Comfortable.

## 2019-06-06 NOTE — PROGRESS NOTES
1900-Report received from CHASE Babcock RN. Pt resting in bed. FOB, friends, and family at bedside. POC discussed.     2100- Dr. Lindo at bedside for assessment. SVE 5/50%-2. IUPC placed. Pt placed in throne position. POC discussed.     2143- Update to Dr. Lindo by phone regarding pt status. Strip reviewed by MD.     2324- Update to Dr. Lindo, by phone, regarding pt status. D5LR ordered.    0030- SVE 5/80%/-1    0040- Update to Dr. Lindo, in unit, regarding pt status. Strip reviewed by MD.    0207- Dr. Lindo at bedside. SVE unchanged from previous. Pt c/o increased low back pain. C/S discussed.    0210- C/S called. Pt to discuss with FOB and family.    0215- Dr. Serna at bedside for epidural bolus.     0220- Pt agrees to proceed with C/S. Dr. Lindo informed.    0254- Pt transferred to OR 1 for primary C/S for failure to progress and persistent OP position, by bed, without incident.    0318- C/S delivery of viable male infant. Apgars 8/9.    0357- [facility] bed [facility].     0435- Scant sanguineous drainage noted on dressing    0440- Dr. Lindo at bedside to assess drainage. RN to continue to observe closely and notify MD if worsens.    0500- [place] and infant [place], accompanied by Jesse (FOB). Report given to RADHA Brooks RN. POC discussed. Dressing assessed. PPU RN to reinforce dressing. Bands and cuddles verified.

## 2019-06-06 NOTE — PROGRESS NOTES
0923- G1 at 41-0 presents for post dates IOL. Received prostaglandin gel last night. Feeling some cramping. No leaking or bleeding. + FM. POC discussed. TOCO/EFM placed. Admission profile completed. Pt does report feeling depressed for the last couple months over lonliness and the lack of family support. Substantial time spent with patient.  is very supportive. Pt denies suicidal ideation.  ordered.   1130- IV started. MD in OR. Will obtain orders.  1310- Cook's cath placed by MD. 80 mL placed in uterine balloon, 60 mL in vaginal. Pt feeling increased cramping with and subsequent to placement.  1344- Fentanyl given per pt request.   1527- Epidural placed by Dr. Serna with SAM Pillai RN. Pt much more comfortable. Aunt and FOB at bedside.   1724- Cook's cath out and AROM by Dr. Bower. SVE 5/70/-1. Pt tolerated well.   1900- Report to night shift MICHAEL Clemens.  1915- Depression discussed with Dr. Lindo.

## 2019-06-06 NOTE — PROGRESS NOTES
L&D Progress Note    Name:   Leslie Loja   Date/Time:  6/6/2019 2:24 AM  Gestational Age:  41w1d  Admit Date:   6/5/2019  Admitting Dx:   Pregnancy  Encounter for planned induction of labor  Encounter for planned induction of labor    Subjective:Patient was comfortable , and initially difficult to get into established labor pattern . This achieved with pitocin , IUPC , with amnio infusion . FHT , over all reassuring , with good variability . Some Decelerations   Uterine contractions: yes  Pain:    yes  Complaints:   yes   Headache: .  no  Blurred vision:  no   SOB:    no   Nausea/vomiting:  no  Epigastric pain:  no  Fetal movement:  normal  Vaginal bleeding: . no    Objective:   Vitals:    06/06/19 0110 06/06/19 0124 06/06/19 0139 06/06/19 0154   BP: 122/99 128/75 132/79 122/73   Pulse: 71 64 71 72   Resp:       Temp:       TempSrc:       SpO2:       Weight:       Height:         Fetal heart variability: moderate  Fetal Heart Rate decelerations: variable  Uterine contractions: regular, every 3 minutes  Cervical: 50% ;  Dilatation .5 ; Station negative2    Fetal position:   Cephalic  Membranes ruptured: yes  AROM:  yes  Meconium: .  no    IUPC: .   yes  FSE .   no    Meds:   Epidural : .  yes  Magnesium sulfate: . no  Pitocin: .  yes    Labs:  Recent Results (from the past 72 hour(s))   Hold Blood Bank Specimen (Not Tested)    Collection Time: 06/05/19 11:30 AM   Result Value Ref Range    Holding Tube - Bb DONE    CBC WITH DIFFERENTIAL    Collection Time: 06/05/19 11:30 AM   Result Value Ref Range    WBC 8.7 4.8 - 10.8 K/uL    RBC 3.91 (L) 4.20 - 5.40 M/uL    Hemoglobin 12.2 12.0 - 16.0 g/dL    Hematocrit 37.3 37.0 - 47.0 %    MCV 95.4 81.4 - 97.8 fL    MCH 31.2 27.0 - 33.0 pg    MCHC 32.7 (L) 33.6 - 35.0 g/dL    RDW 47.3 35.9 - 50.0 fL    Platelet Count 174 164 - 446 K/uL    MPV 12.9 9.0 - 12.9 fL    Neutrophils-Polys 62.00 44.00 - 72.00 %    Lymphocytes 29.20 22.00 - 41.00 %    Monocytes 7.20 0.00 - 13.40  %    Eosinophils 0.70 0.00 - 6.90 %    Basophils 0.20 0.00 - 1.80 %    Immature Granulocytes 0.70 0.00 - 0.90 %    Nucleated RBC 0.00 /100 WBC    Neutrophils (Absolute) 5.40 2.00 - 7.15 K/uL    Lymphs (Absolute) 2.54 1.00 - 4.80 K/uL    Monos (Absolute) 0.63 0.00 - 0.85 K/uL    Eos (Absolute) 0.06 0.00 - 0.51 K/uL    Baso (Absolute) 0.02 0.00 - 0.12 K/uL    Immature Granulocytes (abs) 0.06 0.00 - 0.11 K/uL    NRBC (Absolute) 0.00 K/uL         Assessment:   Gestational Age:   41w1d  Risk Factors:   Post term , IOL , Failed , with 2nd arrest of Dilatation   Labor State:    Prolonged active phase labor.  Pregnancy Complications: Hx of LGSIL   Plan:    Anticipate  for failure to progress delivery type    Discuss with patient and SO. Risks and benefits and discuss alternatives . Explained that 78 hrs at same cervical dilatation , with established adequate analgesia , and now adequate UC's over last 3-4 hrs . No change despite above and position change . Lack of adewquate descent is part of this CPD .   Complete discussion regarding the proposed procedure was conducted both today and throughout the entire ravi-operative period. The procedure: Primary C/S   Was specifically addressed. There is increased risk from  any surgical procedure related to  Anesthesia, increased risk of infection, both abdominal and wound infections as well as heavy bleeding, both during surgery, or delayed bleeding. These were discussed as well.        _______________  Specific to Abdominal surgery ( open), patient is made aware of risk of injury to the bowel, bladder, nerves and the ureter ( urine conduit). Complications to these organs are rare, but do occur and indeed it was discussed that the specific pathology of the patient that necessitated surgery may make the risk greater. Patient additionally is aware of the risk of subsequent adhesions or small bowel obstruction from open abdominal surgery.                                                                                                             ________________  Specific to Hysterectomy, patient is aware that although alternative methods exist for uterine preservation, that both patient and surgeon, agree that the benefits to hysterectomy outweigh the risks and that alternative methods are not indicated or beneficial for her specific pathology ( problem) Patient is also aware that removal of the ovaries may if not already in menopause, speed the onset of menopause, and that specific discussions regarding the risk/benefit of ovarian preservation vs removal and the use of hormone replacement have been discussed and all questions answered.                                                                                                               ________________  Specific to  Section, patient is made aware of the increase risk of infection, hemorrhage, hysterectomy, post operative adhesions and the risk of uterine rupture with subsequent pregnancies. All methods currently accepted will be used to decrease the risk of these complications, but the risk can not be erased. Patient is aware that despite exceptions that repeat  Sections increase the above risks dramatically, and as a consequence may limit the number of children recommended.                                                                                                             _________________  Specific to Trial of Labor After  ( TOLAC), patient is made aware that current recommendations regarding risk , require the patient and doctor to determine if she is a candidate. Not all patients qualify for TOLAC and thus require Respeat  Section. Cervical Ripening and /or induction of labor are not recommended as they are associated with increased risk of uterine rupture.  Patient is aware of the risk of uterine rupture for ideal candidates is between 0.1% and 1% for Previous  (x 1), and that  despite every effort being made, that significant morbidity may occur to both mother and child , including death or disability. Patient is aware that there is increased risk of infection and hemorrhage for those patients who fail a TOLAC and require a repeat  Section, greater than that risk for primary elective Repeat  Sections.                                                                                                             __________________\    Patient Active Problem List    Diagnosis Date Noted   • LGSIL on Pap smear of cervix 2019     Priority: High   • Supervision of normal pregnancy in third trimester 2019     Priority: High       Vahe Lindo M.D.

## 2019-06-06 NOTE — PROGRESS NOTES
Jayleen RN brought pt from labor and delivery. ID bands and cuddles checked and verified with labor and delivery nurse, jayleen. Patient oriented to room and surroundings. Skylight discussed. Bulb syring demonstration. Educated on emergency call light. ID bands and security issues discussed. Educated about the pink photo ID name badges. Educated about not sleeping with infant, no carrying infant in halls, and no leaving infant unattended. Assessment completed on patient. Discussed pain management. IV without redness and swelling. Family at bedside. Encourage pt to call for any needs.

## 2019-06-06 NOTE — OR SURGEON
Immediate Post OP Note    PreOp Diagnosis: Post Term Pregnancy                                Failed IOL                                Arrest of dilatation                           PostOp Diagnosis:  Post Term Pregnancy                                Failed IOL                                Arrest of dilatation                                Persistent OP                           Procedure(s):   SECTION, PRIMARY - Wound Class: Clean Contaminated    Surgeon(s):  Vahe Lindo M.D.    Anesthesiologist/Type of Anesthesia:  Anesthesiologist: Benito Serna M.D./Spinal    Surgical Staff:  Circulator: Jayleen Young R.N.  Scrub Person: Lidia East  First Assist: Mahi Cody R.N.  L&CAREY Baby  Nurse: Anisa Baugh R.N.    Specimens removed if any:  * No specimens in log *    Estimated Blood Loss: 650 cc    Findings: viable male , apgar  8/9 : Persistent OP     Complications: none        2019 4:04 AM Vahe Lindo M.D.

## 2019-06-06 NOTE — PROGRESS NOTES
0700 Received bedside report from MICHAEL Hart. Discussed plan of care. Patient will call for pain medication as needed. All needs met at this time.

## 2019-06-07 PROCEDURE — 700112 HCHG RX REV CODE 229: Performed by: OBSTETRICS & GYNECOLOGY

## 2019-06-07 PROCEDURE — A9270 NON-COVERED ITEM OR SERVICE: HCPCS | Performed by: OBSTETRICS & GYNECOLOGY

## 2019-06-07 PROCEDURE — 770002 HCHG ROOM/CARE - OB PRIVATE (112)

## 2019-06-07 PROCEDURE — 700102 HCHG RX REV CODE 250 W/ 637 OVERRIDE(OP): Performed by: OBSTETRICS & GYNECOLOGY

## 2019-06-07 RX ADMIN — OXYCODONE HYDROCHLORIDE AND ACETAMINOPHEN 1 TABLET: 5; 325 TABLET ORAL at 17:10

## 2019-06-07 RX ADMIN — IBUPROFEN 800 MG: 800 TABLET ORAL at 17:07

## 2019-06-07 RX ADMIN — SIMETHICONE CHEW TAB 80 MG 80 MG: 80 TABLET ORAL at 21:17

## 2019-06-07 RX ADMIN — OXYCODONE HYDROCHLORIDE AND ACETAMINOPHEN 1 TABLET: 10; 325 TABLET ORAL at 08:53

## 2019-06-07 RX ADMIN — OXYCODONE HYDROCHLORIDE AND ACETAMINOPHEN 1 TABLET: 5; 325 TABLET ORAL at 13:20

## 2019-06-07 RX ADMIN — IBUPROFEN 800 MG: 800 TABLET ORAL at 08:53

## 2019-06-07 RX ADMIN — OXYCODONE HYDROCHLORIDE AND ACETAMINOPHEN 1 TABLET: 10; 325 TABLET ORAL at 21:17

## 2019-06-07 RX ADMIN — DOCUSATE SODIUM 100 MG: 100 CAPSULE, LIQUID FILLED ORAL at 21:16

## 2019-06-07 RX ADMIN — DOCUSATE SODIUM 100 MG: 100 CAPSULE, LIQUID FILLED ORAL at 08:53

## 2019-06-07 RX ADMIN — OXYCODONE HYDROCHLORIDE AND ACETAMINOPHEN 1 TABLET: 10; 325 TABLET ORAL at 02:47

## 2019-06-07 RX ADMIN — IBUPROFEN 800 MG: 800 TABLET ORAL at 00:39

## 2019-06-07 RX ADMIN — SIMETHICONE CHEW TAB 80 MG 80 MG: 80 TABLET ORAL at 08:53

## 2019-06-07 ASSESSMENT — EDINBURGH POSTNATAL DEPRESSION SCALE (EPDS)
I HAVE LOOKED FORWARD WITH ENJOYMENT TO THINGS: AS MUCH AS I EVER DID
I HAVE BEEN ABLE TO LAUGH AND SEE THE FUNNY SIDE OF THINGS: AS MUCH AS I ALWAYS COULD
I HAVE FELT SCARED OR PANICKY FOR NO GOOD REASON: NO, NOT AT ALL
I HAVE BEEN ANXIOUS OR WORRIED FOR NO GOOD REASON: NO, NOT AT ALL
THINGS HAVE BEEN GETTING ON TOP OF ME: NO, I HAVE BEEN COPING AS WELL AS EVER
I HAVE BEEN SO UNHAPPY THAT I HAVE HAD DIFFICULTY SLEEPING: NOT AT ALL
I HAVE BLAMED MYSELF UNNECESSARILY WHEN THINGS WENT WRONG: NO, NEVER
I HAVE FELT SAD OR MISERABLE: NO, NOT AT ALL
I HAVE BEEN SO UNHAPPY THAT I HAVE BEEN CRYING: NO, NEVER
THE THOUGHT OF HARMING MYSELF HAS OCCURRED TO ME: NEVER

## 2019-06-07 NOTE — LACTATION NOTE
This note was copied from a baby's chart.  Mother called for lactation assistance, baby sleepy, after removing clothing and blankets baby woke for feeding attempt, assisted with and educated on proper positioning for a deep latch, baby very fussy at the breast, unable to achieve effective or sustained latch, attempted to settle baby with a small amount of formula, remain unable to achieve effective or sustained latch/suck, able to hand express colostrum easily, educated mother on proper hand expression technique, mother agreed to offer to use HG pump since baby has not been latching effectively and is over 24 hours, breast pump provided, educated on proper pump use and settings, dish soap and education on proper washing of pump parts also provided, mother able to pump at a suction of 28% comfortably, encouraged to set suction to comfort and to decrease speed from 80-60 after 2 minutes, flange fit appears appropriate and mother denies any rubbing, encouraged to call for assistance as needed.    Pump rental information provided, discussed suggestion that she rent pump for home if breastfeeding difficulty continues to day of discharge    Plan:  Q 3 hours attempt to breastfeed (more often if feeding cues noted)  For no/sub-optimal latch supplement with EBM/formula and pump for 15 minutes    Encouraged to call for assistance as needed

## 2019-06-07 NOTE — LACTATION NOTE
This note was copied from a baby's chart.  MOB states that infant continues to have difficulty latching and staying awake. She just spoon fed back HE breast milk and is supplementing with formula. reviewed in the New Beginnings booklet. Encouraged to continue doing STS every 2-3 hours or as infant shows cues and to call for lactation support to assist with latching. Reviewed hunger cues and normal feeding patterns in the New Beginnings booklet.

## 2019-06-07 NOTE — PROGRESS NOTES
1900: report received from rn, assumed care of patient.  rn at bs, pt denies needs.  Will assist with breastfeeding after assessment.    2000: assessment done, rn at bs, assisting with breastfeeding.  Pt denies needs.    2230: rn called to bs, pt would like pain meds.  meds given.  See MAR.    0030: rn at bs for pain medication.  Pt was up to br and felt like she strained too hard.  Incision inspected.  mepilex dressing appears the same as prior assessment with some old drainage.

## 2019-06-07 NOTE — CARE PLAN
Problem: Potential for postpartum infection related to surgical incision, compromised uterine condition, urinary tract or respiratory compromise  Goal: Patient will be afebrile and free from signs and symptoms of infection    Intervention: Get patient out of bed and ambulating as ordered by MD//CNM  Pt ambulating to restroom and encouraged to ambulate hallways.      Problem: Alteration in comfort related to surgical incision and/or after birth pains  Goal: Patient is able to ambulate, care for self and infant with acceptable pain level    Intervention: Administer pain meds as requested by patient and ordered by MD//MAILEM  Pain meds administered to maintain pt's comfort goal.

## 2019-06-07 NOTE — CARE PLAN
Problem: Bowel/Gastric:  Goal: Will not experience complications related to bowel motility    Intervention: Implement interventions to promote bowel evacuation if inadequate bowel movements in past 48 hours  Pt educated on drinking water and mobilizing

## 2019-06-07 NOTE — CARE PLAN
Problem: Potential for postpartum infection related to surgical incision, compromised uterine condition, urinary tract or respiratory compromise  Goal: Patient will be afebrile and free from signs and symptoms of infection  Pt educated on signs and symptoms of infection

## 2019-06-07 NOTE — PROGRESS NOTES
Obstetrics & Gynecology Post-Delivery Progress Note    Date of Service  2019    28 y.o.  1 s/p , Low Transverse  . Admit for induction of labor with gel then balloon placement  Delivery date: 19  Breastfeeding: Yes    Events  No events    Subjective  Pain: Yes,  controlled  Bleeding: lochia minimal  PO's: taking regular diet  Voiding: without difficulty  Ambulating: yes  Feeding: breastfeeding well    Objective  Temp:  [36.4 °C (97.6 °F)-37.3 °C (99.2 °F)] 37.1 °C (98.8 °F)  Pulse:  [62-96] 80  Resp:  [17-19] 19  BP: (106-118)/(58-65) 108/65  SpO2:  [96 %-98 %] 96 %    Physical Exam  General: well and resting  Chest/Breasts: nipples intact and breasts soft  Fundus: firm and below umbilicus  Incision: dressing clean, dry, intact The site has been marked and new new oozing or expansion of marked site noted.   Perineum: deferred  Extremities: symmetric and no edema    Lab Results   Component Value Date    RBC 3.16 (L) 2019    ABOGROUP O 2019    RH POS 2019     Immunization History   Administered Date(s) Administered   • DTP 1998, 1998, 1998   • HPV Quadrivalent Vaccine (GARDASIL) 10/31/2007, 2007   • Hepatitis A Vaccine, Ped/Adol 10/31/2007   • Hepatitis B Vaccine Non-Recombivax (Ped/Adol) 1998, 1998, 2000   • Hib Vaccine (HbOC)-Historical Data 1994   • IPV 2000, 10/31/2007   • Influenza Vaccine Pediatric Split 10/31/2007   • MMR Vaccine 1994, 1998   • Meningococcal Conjugate Vaccine MCV4 (Menactra) 10/31/2007   • OPV - Historical Data 1998, 1998   • TD Vaccine 2000   • Tdap Vaccine 10/31/2007, 2019       Assessment/Plan  Leslie Loja is a 28 y.o.  postop day 1 s/p , Low Transverse .    1. Post care: meeting all goals  2. Hemodynamics: stable  3. Pain: controlled  4. PNL:   Lab Results   Component Value Date    RH POS 2019    RUBELLAIGG 70.00 2019      5. Method of Feeding: plans to breastfeed  6. Method of Contraception: as compatible with breast feeding  7. Vaccinations:   Immunization History   Administered Date(s) Administered   • DTP 09/16/1998, 11/04/1998, 12/02/1998   • HPV Quadrivalent Vaccine (GARDASIL) 10/31/2007, 12/05/2007   • Hepatitis A Vaccine, Ped/Adol 10/31/2007   • Hepatitis B Vaccine Non-Recombivax (Ped/Adol) 09/16/1998, 11/04/1998, 09/20/2000   • Hib Vaccine (HbOC)-Historical Data 09/07/1994   • IPV 09/20/2000, 10/31/2007   • Influenza Vaccine Pediatric Split 10/31/2007   • MMR Vaccine 09/07/1994, 09/16/1998   • Meningococcal Conjugate Vaccine MCV4 (Menactra) 10/31/2007   • OPV - Historical Data 09/16/1998, 11/04/1998   • TD Vaccine 09/20/2000   • Tdap Vaccine 10/31/2007, 03/11/2019     8. Disposition: likely home postop day 3    No new Assessment & Plan notes have been filed under this hospital service since the last note was generated.  Service: Obstetrics & Gynecology       VTE prophylaxis: patient is ambulatory but should be in place while resting.     Rose Bojorquez D.N.P.

## 2019-06-07 NOTE — PROGRESS NOTES
0700- Report received from CHASE Sheffield RN. Pt sleeping, baby in the nursery.  0800- Baby in the room, MD at the bedside.  1200- pt showered.  1730- Report given to MICHAEL Moralez.

## 2019-06-07 NOTE — CONSULTS
Lactation note:  Initial visit.  Discussed normal  feeding behaviors and normal course of breastfeeding at 12-24-48-72 hours, and what to expect. Discussed importance of offering breast with either feeding cues or at least every 2-3 hours, and even if infant shows no interest, can do hand expression into infant's lips. Showed MOB how to hand express, MOB able to return demonstrate.  Encouraged to continue doing skin to skin. Discussed signs of a good latch, voiding and stooling patterns, feeding cues, stomach size, and importance of establishing milk supply with frequency of feedings.  New Beginning booklet given, and breastfeeding content reviewed. MOB states she also wants to do formula feeding, encouraged she practice with latching and offer breast before supplementing with formula.  Plan for tonight is to continue to offer breast first, if not latching well, can hand express colostrum, and refeed to infant, then to follow with formula afterwards.      Information and phone number to the Lactation connection, and  invited to breastfeeding circles.    Encouraged to call for lactation support as needed.

## 2019-06-08 VITALS
RESPIRATION RATE: 16 BRPM | TEMPERATURE: 98.6 F | SYSTOLIC BLOOD PRESSURE: 108 MMHG | OXYGEN SATURATION: 98 % | DIASTOLIC BLOOD PRESSURE: 68 MMHG | WEIGHT: 202 LBS | HEART RATE: 89 BPM | HEIGHT: 66 IN | BODY MASS INDEX: 32.47 KG/M2

## 2019-06-08 PROCEDURE — 700102 HCHG RX REV CODE 250 W/ 637 OVERRIDE(OP): Performed by: OBSTETRICS & GYNECOLOGY

## 2019-06-08 PROCEDURE — 59514 CESAREAN DELIVERY ONLY: CPT

## 2019-06-08 PROCEDURE — 303615 HCHG EPIDURAL/SPINAL ANESTHESIA FOR LABOR

## 2019-06-08 PROCEDURE — A9270 NON-COVERED ITEM OR SERVICE: HCPCS | Performed by: OBSTETRICS & GYNECOLOGY

## 2019-06-08 RX ORDER — ACETAMINOPHEN 325 MG/1
650 TABLET ORAL EVERY 4 HOURS PRN
Qty: 30 TAB | Refills: 0 | Status: ON HOLD | OUTPATIENT
Start: 2019-06-08 | End: 2020-06-02

## 2019-06-08 RX ORDER — PSEUDOEPHEDRINE HCL 30 MG
100 TABLET ORAL 2 TIMES DAILY PRN
Qty: 60 CAP | Refills: 0 | Status: SHIPPED | OUTPATIENT
Start: 2019-06-08 | End: 2020-05-30

## 2019-06-08 RX ORDER — OXYCODONE HYDROCHLORIDE AND ACETAMINOPHEN 5; 325 MG/1; MG/1
1 TABLET ORAL EVERY 4 HOURS PRN
Qty: 20 TAB | Refills: 0 | Status: SHIPPED | OUTPATIENT
Start: 2019-06-08 | End: 2019-06-10

## 2019-06-08 RX ORDER — IBUPROFEN 800 MG/1
800 TABLET ORAL EVERY 8 HOURS PRN
Qty: 30 TAB | Refills: 0 | Status: SHIPPED | OUTPATIENT
Start: 2019-06-08 | End: 2020-05-30

## 2019-06-08 RX ADMIN — OXYCODONE HYDROCHLORIDE AND ACETAMINOPHEN 1 TABLET: 10; 325 TABLET ORAL at 11:32

## 2019-06-08 RX ADMIN — OXYCODONE HYDROCHLORIDE AND ACETAMINOPHEN 1 TABLET: 5; 325 TABLET ORAL at 01:17

## 2019-06-08 RX ADMIN — OXYCODONE HYDROCHLORIDE AND ACETAMINOPHEN 1 TABLET: 10; 325 TABLET ORAL at 05:46

## 2019-06-08 RX ADMIN — IBUPROFEN 800 MG: 800 TABLET ORAL at 01:17

## 2019-06-08 NOTE — PROGRESS NOTES
Pt in wheelchair and out of department with escort, pt in NAD, awake, alert, interactive and age appropriate.

## 2019-06-08 NOTE — DISCHARGE PLANNING
Discharge Planning Assessment Post Partum    Reason for Referral: Hx of depression  Address: [address] [place] [address]  Type of Living Situation: House with FOB and FOB's son part time  Mom Diagnosis: Pregnancy  Baby Diagnosis:   Primary Language: English    Name of Baby: Arnie Reis  Father of the Baby: Eyad Reis  Involved in baby’s care? Yes  Contact Information: [phone]    Prenatal Care: Yes  Mom's PCP: None  PCP for new baby: Dr. Nito Decker    Support System: Yes  Coping/Bonding between mother & baby: Yes  Source of Feeding: Breast  Supplies for Infant: Prepared    Mom's Insurance: [employer]  Baby Covered on Insurance: NAILA is working on adding baby to his [employer] insurance  Mother Employed/School: No, NAILA works at [employer]  Other children in the home/names & ages: FOBRYANT's son part time, Jesse-5    Financial Hardship/Income: No  Mom's Mental status: Alert and Oriented x 4  Services used prior to admit: None    CPS History: No  Psychiatric History: No, MOB stated she does not have a history of depression and is not experiencing symptoms of depression. LSW encouraged MOB to reach out to her family if she is experiencing symptoms of depression in the future.   Domestic Violence History: No  Drug/ETOH History: No    Resources Provided: Children and Family Resource List, Maternal Bonding and Support Information  Referrals Made: None     Clearance for Discharge: Baby is clear to discharge home with MOB/FOB upon medical clearance.     Ongoing Plan: No further social work needs at this time.

## 2019-06-08 NOTE — LACTATION NOTE
Mother states feeding plan remains unchanged from yesterday, states she is continuing to attempt to breastfeed Q 3 hours, states she is also pumping and supplementing for no/sub-optimal breastfeeding, encouraged again to call insurance to find out how to get personal pump, mother does have hand pump if needed, rental pump information provided as well, supplement guidelines provided, encouraged to call for assistance as needed.

## 2019-06-08 NOTE — DISCHARGE INSTRUCTIONS
POSTPARTUM DISCHARGE INSTRUCTIONS FOR MOM    YOB: 1990   Age: 28 y.o.               Admit Date: 2019     Discharge Date: 2019  Attending Doctor:  ALEXANDRU Bailey*                  Allergies:  Patient has no known allergies.    Discharged to home by car. Discharged via wheelchair, hospital escort: Yes.  Special equipment needed: Not Applicable  Belongings with: Personal  Be sure to schedule a follow-up appointment with your primary care doctor or any specialists as instructed.     Discharge Plan:   Diet Plan: Discussed  Activity Level: Discussed  Confirmed Follow up Appointment: Patient to Call and Schedule Appointment  Confirmed Symptoms Management: Discussed  Medication Reconciliation Updated: Yes  Influenza Vaccine Indication: Not indicated: Previously immunized this influenza season and > 8 years of age    REASONS TO CALL YOUR OBSTETRICIAN:  1.   Persistent fever or shaking chills (Temperature higher than 100.4)  2.   Heavy bleeding (soaking more than 1 pad per hour); Passing clots  3.   Foul odor from vagina  4.   Mastitis (Breast infection; breast pain, chills, fever, redness)  5.   Urinary pain, burning or frequency  6.   Episiotomy infection  7.   Abdominal incision infection  8.   Severe depression longer than 24 hours    HAND WASHING  · Prior to handling the baby.  · Before breastfeeding or bottle feeding baby.  · After using the bathroom or changing the baby's diaper.    WOUND CARE  Ask your physician for additional care instructions.  In general:    ·  Incision:      · Keep clean and dry.    · Do NOT lift anything heavier than your baby for up to 6 weeks.    · There should not be any opening or pus.      VAGINAL CARE  · Nothing inside vagina for 6 weeks: no sexual intercourse, tampons or douching.  · Bleeding may continue for 2-4 weeks.  Amount may vary.    · Call your physician for heavy bleeding which means soaking more than 1 pad per hour    BIRTH CONTROL  · It is  "possible to become pregnant at any time after delivery and while breastfeeding.  · Plan to discuss a method of birth control with your physician at your follow up visit. visit.    DIET AND ELIMINATION  · Eating more fiber (bran cereal, fruits, and vegetables) and drinking plenty of fluids will help to avoid constipation.  · Urinary frequency after childbirth is normal.    POSTPARTUM BLUES  During the first few days after birth, you may experience a sense of the \"blues\" which may include impatience, irritability or even crying.  These feeling come and go quickly.  However, as many as 1 in 10 women experience emotional symptoms known as postpartum depression.    Postpartum depression:  May start as early as the second or third day after delivery or take several weeks or months to develop.  Symptoms of \"blues\" are present, but are more intense:  Crying spells; loss of appetite; feelings of hopelessness or loss of control; fear of touching the baby; over concern or no concern at all about the baby; little or no concern about your own appearance/caring for yourself; and/or inability to sleep or excessive sleeping.  Contact your physician if you are experiencing any of these symptoms.    Crisis Hotline:  · [place] Crisis Hotline:  3-812-MEPPYSO  Or 1-519.246.5883  · [place] Crisis Hotline:  1-594.440.4107  Or 206-866-2187    PREVENTING SHAKEN BABY:  If you are angry or stressed, PUT THE BABY IN THE CRIB, step away, take some deep breaths, and wait until you are calm to care for the baby.  DO NOT SHAKE THE BABY.  You are not alone, call a supporter for help.    · Crisis [facility] Center 24/7 crisis line 140-898-4044 or 1-171.537.1844  · You can also text them, text \"ANSWER\" to 285169    QUIT SMOKING/TOBACCO USE:  I understand the use of any tobacco products increases my chance of suffering from future heart disease and could cause other illnesses which may shorten my life. Quitting the use of tobacco products is the single most " important thing I can do to improve my health. For further information on smoking / tobacco cessation call a Toll Free Quit Line at 1-833.372.2170 (*[employer] [place]) or 1-664.738.5143 (American Lung Association) or you can access the web based program at www.lungusa.org.    · [place] Tobacco Users Help Line:  (801) 969-6408       Toll Free: 1-442.835.8255  · Quit Tobacco Program [facility] (729)009-8543    DEPRESSION / SUICIDE RISK:  As you are discharged from this [facility], it is important to learn how to keep safe from harming yourself.    Recognize the warning signs:  · Abrupt changes in personality, positive or negative- including increase in energy   · Giving away possessions  · Change in eating patterns- significant weight changes-  positive or negative  · Change in sleeping patterns- unable to sleep or sleeping all the time   · Unwillingness or inability to communicate  · Depression  · Unusual sadness, discouragement and loneliness  · Talk of wanting to die  · Neglect of personal appearance   · Rebelliousness- reckless behavior  · Withdrawal from people/activities they love  · Confusion- inability to concentrate     If you or a loved one observes any of these behaviors or has concerns about self-harm, here's what you can do:  · Talk about it- your feelings and reasons for harming yourself  · Remove any means that you might use to hurt yourself (examples: pills, rope, extension cords, firearm)  · Get professional help from the community (Mental Health, Substance Abuse, psychological counseling)  · Do not be alone:Call your Safe Contact- someone whom you trust who will be there for you.  · Call your local CRISIS HOTLINE 448-6906 or 065-259-1780  · Call your local Children's Mobile Crisis Response Team [place] (035) 802-1972 or www.[employer]  · Call the toll free National Suicide Prevention Hotlines   · National Suicide Prevention Lifeline 763-513-FHLP  (9823)  · [facility] 800-SUICIDE (878-6580)    DISCHARGE SURVEY:  Thank you for choosing [facility].  We hope we provided you with very good care.  You may be receiving a survey in the mail.  Please fill it out.  Your opinion is valuable to us.    ADDITIONAL EDUCATIONAL MATERIALS GIVEN TO PATIENT:        My signature on this form indicates that:  1.  I have reviewed and understand the above information  2.  My questions regarding this information have been answered to my satisfaction.  3.  I have formulated a plan with my discharge nurse to obtain my prescribed medication for home.

## 2019-06-08 NOTE — CARE PLAN
Problem: Communication  Goal: The ability to communicate needs accurately and effectively will improve    Intervention: Educate patient and significant other/support system about the plan of care, procedures, treatments, medications and allow for questions  Pt updated on POC      Problem: Infection  Goal: Will remain free from infection    Intervention: Assess signs and symptoms of infection  Pt remains free from s/s of infection

## 2019-06-08 NOTE — PROGRESS NOTES
Pt assessed. Eliecer jenkins. Pt reports 0/10 pain White board updated.  Pt updated on POC. No other needs at this time.

## 2019-06-08 NOTE — DISCHARGE SUMMARY
Discharge Summary:     Date of Admission: 2019  Date of Discharge: 19      Admitting diagnosis:    1. Pregnancy @ 41w1d  2. Induction of labor with gel then balloon placement    Discharge Diagnosis:   1. Status post  for failure to progress.  2. Arrest of dilation   3. Persistent OP     Pregnancy Complications: none  Tubal Ligation:  no    Past Medical History:   Diagnosis Date   • Gynecological disorder     Abnormal Pap     OB History    Para Term  AB Living   1 1 1     1   SAB TAB Ectopic Molar Multiple Live Births           0 1      # Outcome Date GA Lbr Bijan/2nd Weight Sex Delivery Anes PTL Lv   1 Term 19 41w1d  2.99 kg (6 lb 9.5 oz) M CS-LTranv EPI N ELMA      Complications: Failure to Progress in Second Stage        No past surgical history on file.  Patient has no known allergies.    Patient Active Problem List   Diagnosis   • LGSIL on Pap smear of cervix   • Supervision of normal pregnancy in third trimester   • Delivery of pregnancy by  section       Hospital Course:   41 y.o. , now para 1, was admitted with the above mentioned diagnosis, underwent Induction of Labor,  for failure to progress. Pt gave birth to baby boy with APGARs of 8/9.  Patient's postpartum course was unremarkable, with progressive advancement in diet , ambulation and toleration of oral analgesia. Patient without complaints today and desires discharge.  For postpartum contraception, pt desires abstinence for 5 weeks and then an IUD during her 5 week postpartum visit.     Physical Exam:  Temp:  [37 °C (98.6 °F)-37.3 °C (99.1 °F)] 37 °C (98.6 °F)  Pulse:  [81-89] 89  Resp:  [16] 16  BP: (108-119)/(68-70) 108/68  SpO2:  [96 %-98 %] 98 %  Physical Exam  General: well  Chest/Breasts: nipples intact and breasts soft   Abdomen: nontender, soft, non-distended  Fundus: firm and below umbilicus  Incision: slight clear drainage, drainage marked with marker and date   Perineum: well  approximated  Extremities: symmetric, calves nontender    Current Facility-Administered Medications   Medication Dose   • LR infusion     • miSOPROStol (CYTOTEC) tablet 800 mcg  800 mcg   • docusate sodium (COLACE) capsule 100 mg  100 mg   • simethicone (MYLICON) chewable tab 80 mg  80 mg   • acetaminophen (TYLENOL) tablet 650 mg  650 mg   • oxyCODONE-acetaminophen (PERCOCET) 5-325 MG per tablet 1 Tab  1 Tab   • oxyCODONE-acetaminophen (PERCOCET-10)  MG per tablet 1 Tab  1 Tab   • morphine (pf) 4 mg/ml injection 4 mg  4 mg   • ondansetron (ZOFRAN) syringe/vial injection 4 mg  4 mg    Or   • ondansetron (ZOFRAN ODT) dispertab 4 mg  4 mg   • diphenhydrAMINE (BENADRYL) tablet/capsule 25 mg  25 mg    Or   • diphenhydrAMINE (BENADRYL) injection 25 mg  25 mg   • ibuprofen (MOTRIN) tablet 800 mg  800 mg       Recent Labs      19   1130  19   0417  19   1340   WBC  8.7  15.9*  16.3*   RBC  3.91*  3.44*  3.16*   HEMOGLOBIN  12.2  10.7*  10.1*   HEMATOCRIT  37.3  33.9*  30.1*   MCV  95.4  98.5*  95.3   MCH  31.2  31.1  32.0   MCHC  32.7*  31.6*  33.6   RDW  47.3  47.7  45.9   PLATELETCT  174  137*  143*   MPV  12.9  12.4  12.0       Activity:   Discharge to home  Pelvic Rest x 6 weeks  Call or come to ED for: heavy vaginal bleeding, fever >100.4, severe abdominal pain, severe headache, chest pain, shortness of breath,  N/V, incisional drainage, or other concerns.      Assessment:  normal postpartum course     Follow up:  [facility] 1 week post delivery for incision check for  delivery.      Discharge Instructions:  Pelvic rest x 6 weeks  No heavy lifting until cleared by physician  Return to ED or come to the office for severe headache, shortness of breath, chest pain, heavy vaginal bleeding, incisional drainage, foul smelling vaginal discharge, or fever >100.4     Discharge Meds:   Current Outpatient Prescriptions   Medication Sig Dispense Refill   • acetaminophen (TYLENOL) 325 MG  Tab Take 2 Tabs by mouth every four hours as needed. 30 Tab 0   • docusate sodium 100 MG Cap Take 100 mg by mouth 2 times a day as needed for Constipation. 60 Cap 0   • ibuprofen (MOTRIN) 800 MG Tab Take 1 Tab by mouth every 8 hours as needed (For cramping after delivery; do not give if patient is receiving ketorolac (Toradol)). 30 Tab 0   • oxyCODONE-acetaminophen (PERCOCET) 5-325 MG Tab Take 1 Tab by mouth every four hours as needed (for Moderate Pain (Pain Scale 4-6) after delivery) for up to 20 doses. 20 Tab 0

## 2019-06-08 NOTE — CARE PLAN
Problem: Altered physiologic condition related to postoperative  delivery  Goal: Patient physiologically stable as evidenced by normal lochia, palpable uterine involution and vital signs within normal limits  Outcome: PROGRESSING AS EXPECTED  Fundus firm at umbilicus with scant lochia.    Problem: Potential for postpartum infection related to surgical incision, compromised uterine condition, urinary tract or respiratory compromise  Goal: Patient will be afebrile and free from signs and symptoms of infection  Outcome: PROGRESSING AS EXPECTED  Incision dressing intact with small,old, dry,sanguinous drainage noted with marked margins unchanged. Vital signs are within acceptable limits.    Problem: Alteration in comfort related to surgical incision and/or after birth pains  Goal: Patient is able to ambulate, care for self and infant with acceptable pain level  Outcome: PROGRESSING AS EXPECTED  Ambulating  and voiding without difficulty.  Goal: Patient verbalizes acceptable pain level  Outcome: PROGRESSING AS EXPECTED  Verbalizes acceptable pain relief with pain medication being given as requested.Warm packs also being used if pain medication is not available yet.    Problem: Potential knowledge deficit related to lack of understanding of self and  care  Goal: Patient will verbalize understanding of self and infant care  Outcome: PROGRESSING AS EXPECTED  Education reinforced on the difference between feeding frequency for formula and breast feeding. Infant was being formula fed every 2 hours so recommended that infant can be fed every 3 hours for formula but as often as infant wants for breast feeding.

## 2019-06-10 ENCOUNTER — TELEPHONE (OUTPATIENT)
Dept: OBGYN | Facility: CLINIC | Age: 29
End: 2019-06-10

## 2019-06-10 NOTE — TELEPHONE ENCOUNTER
Pt's partner called concerned about Leslie's feet both being very swollen. Dr. Johnson advised pt to elevate feet and swelling will slowly start to go down. Pt received a lot of fluids during her admission. Pt advised per Dr. Johnson if she starts to develop shortness of breath,  chest pain, or discoloration in leg then she needs to report to ER right away.

## 2019-06-19 ENCOUNTER — POST PARTUM (OUTPATIENT)
Dept: OBGYN | Facility: MEDICAL CENTER | Age: 29
End: 2019-06-19
Payer: COMMERCIAL

## 2019-06-19 VITALS — SYSTOLIC BLOOD PRESSURE: 102 MMHG | BODY MASS INDEX: 29.21 KG/M2 | WEIGHT: 181 LBS | DIASTOLIC BLOOD PRESSURE: 70 MMHG

## 2019-06-19 DIAGNOSIS — Z34.03 ENCOUNTER FOR SUPERVISION OF NORMAL FIRST PREGNANCY IN THIRD TRIMESTER: ICD-10-CM

## 2019-06-19 DIAGNOSIS — R87.612 LGSIL ON PAP SMEAR OF CERVIX: ICD-10-CM

## 2019-06-19 DIAGNOSIS — Z97.5 IUD CONTRACEPTION: ICD-10-CM

## 2019-06-19 PROCEDURE — 90050 PR POSTPARTUM VISIT: CPT | Performed by: NURSE PRACTITIONER

## 2019-06-19 NOTE — PROGRESS NOTES
C/s incision check    S) Patient is a G 1 P 1001 now s/p ltcs 13 days for persistent OP and FTP. States doing well, no pp depression. Bottle feeding. States no current lochia, no dysuria. Basically no complaints today. Taking PNV  O) /70        Weight 181       H&H upon discharge 10.1/30.1       Incision WDI, well approximated. No edema or erythema.   A) s/p ltcs  P) doing well. Desires Mirena IUD. Paperwork done today and referral placed. Return for full postpartum exam.

## 2019-06-19 NOTE — PROGRESS NOTES
Pt. here for C/S check delivered on   Currently :Bottle feeding  Pt. States no complaints  Post partum visit on [place] on the way out

## 2019-07-10 ENCOUNTER — TELEPHONE (OUTPATIENT)
Dept: OBGYN | Facility: CLINIC | Age: 29
End: 2019-07-10

## 2019-07-10 NOTE — TELEPHONE ENCOUNTER
7/10 Patient needs to contact [facility], ready to ship. Sw patient [facility] phone number was given to her. Per patient she will call.

## 2019-07-17 ENCOUNTER — TELEPHONE (OUTPATIENT)
Dept: OBGYN | Facility: CLINIC | Age: 29
End: 2019-07-17

## 2019-07-17 NOTE — TELEPHONE ENCOUNTER
Called patient, JOSH with [facility], asking him to have patient call us back in regards to her IUD referral. Patient needs to call [facility] by 07/19 or else her referral will be closed. [phone]

## 2019-07-17 NOTE — TELEPHONE ENCOUNTER
Patient called back and was given [facility] phone number. Patient was advised if she didn't call by Friday her referral will be closed. Per patient she will call today. Patient verbalized understanding and had no further questions. (Italian speaking)

## 2019-07-24 ENCOUNTER — POST PARTUM (OUTPATIENT)
Dept: OBGYN | Facility: MEDICAL CENTER | Age: 29
End: 2019-07-24
Payer: COMMERCIAL

## 2019-07-24 ENCOUNTER — HOSPITAL ENCOUNTER (OUTPATIENT)
Facility: MEDICAL CENTER | Age: 29
End: 2019-07-24
Attending: NURSE PRACTITIONER
Payer: COMMERCIAL

## 2019-07-24 VITALS — DIASTOLIC BLOOD PRESSURE: 80 MMHG | BODY MASS INDEX: 29.7 KG/M2 | WEIGHT: 184 LBS | SYSTOLIC BLOOD PRESSURE: 120 MMHG

## 2019-07-24 DIAGNOSIS — R87.612 LGSIL ON PAP SMEAR OF CERVIX: ICD-10-CM

## 2019-07-24 DIAGNOSIS — Z34.03 ENCOUNTER FOR SUPERVISION OF NORMAL FIRST PREGNANCY IN THIRD TRIMESTER: ICD-10-CM

## 2019-07-24 PROBLEM — Z34.93 SUPERVISION OF NORMAL PREGNANCY IN THIRD TRIMESTER: Status: RESOLVED | Noted: 2019-03-11 | Resolved: 2019-07-24

## 2019-07-24 PROCEDURE — 87591 N.GONORRHOEAE DNA AMP PROB: CPT

## 2019-07-24 PROCEDURE — 90050 PR POSTPARTUM VISIT: CPT | Performed by: NURSE PRACTITIONER

## 2019-07-24 PROCEDURE — 88175 CYTOPATH C/V AUTO FLUID REDO: CPT

## 2019-07-24 PROCEDURE — 87491 CHLMYD TRACH DNA AMP PROBE: CPT

## 2019-07-24 ASSESSMENT — EDINBURGH POSTNATAL DEPRESSION SCALE (EPDS)
I HAVE LOOKED FORWARD WITH ENJOYMENT TO THINGS: AS MUCH AS I EVER DID
THE THOUGHT OF HARMING MYSELF HAS OCCURRED TO ME: NEVER
I HAVE BEEN SO UNHAPPY THAT I HAVE HAD DIFFICULTY SLEEPING: NOT AT ALL
I HAVE BEEN ABLE TO LAUGH AND SEE THE FUNNY SIDE OF THINGS: AS MUCH AS I ALWAYS COULD
I HAVE BEEN SO UNHAPPY THAT I HAVE BEEN CRYING: NO, NEVER
I HAVE FELT SAD OR MISERABLE: NO, NOT AT ALL
I HAVE FELT SCARED OR PANICKY FOR NO GOOD REASON: NO, NOT AT ALL
I HAVE BEEN ANXIOUS OR WORRIED FOR NO GOOD REASON: NO, NOT AT ALL
THINGS HAVE BEEN GETTING ON TOP OF ME: NO, I HAVE BEEN COPING AS WELL AS EVER
TOTAL SCORE: 0
I HAVE BLAMED MYSELF UNNECESSARILY WHEN THINGS WENT WRONG: NO, NEVER

## 2019-07-24 NOTE — PROGRESS NOTES
Subjective   Subjective:    Leslie Loja is a 28 y.o. female who presents for her postpartum exam s/p LTCS for persistent OP position and FTP. She was admitted for induction of labor. Her prenatal course was complicated by abnormal pap smear. . Her postpartum course was uncomplicated. She denies dysuria, vaginal bleeding, odor, itching or breast problems. She is bottle feeding. She desires an mirena for her birth control method. Reports not having sex prior to this appointment. Eating a regular diet without difficulty. Bowel movement are Normal.  The patient is not having any pain. No current menses. Patient Denies Incisional pain, drainage or redness. Patient denies postpartum depression.     Problem List     Patient Active Problem List    Diagnosis Date Noted   • LGSIL on Pap smear of cervix 2019     Priority: High   • Delivery of pregnancy by  section 2019       Objective    See PE  Lab: H&H at discharge 10..1  Weight - 184 lb  Vitals 120/80    Assessment   Assessment:    1. PP care of nonlactating women   2. Exam WNL   3. Pap LGSIL   4. Desires contraception - mirena    Plan   Plan:    1. Breastfeeding support not applicable  2. Continue PNV   3. Contraceptive counseling - Will make appt for mirena insert  4. Encouraged condom use    5. Discussed diet, exercise and resumption of sexual activity   6. Preconception guidance for next pregnancy if applicable. Previous c/s as risk factor. Folic acid for all women of childbearing age.   7.  Follow up needed - make appt for mirena insertion  8.  Smoking/etoh/drug screening - none.

## 2019-07-24 NOTE — PROGRESS NOTES
Subjective:      Leslie Loja is a 28 y.o. female who presents with No chief complaint on file.            HPI    ROS       Objective:     /80   Wt 83.5 kg (184 lb)   BMI 29.70 kg/m²      Physical Exam   Constitutional: She is oriented to person, place, and time. She appears well-developed and well-nourished.   HENT:   Head: Normocephalic.   Eyes: Pupils are equal, round, and reactive to light.   Neck: Normal range of motion. No thyromegaly present.   Cardiovascular: Normal rate and regular rhythm.    Pulmonary/Chest: Effort normal and breath sounds normal.   Abdominal: Soft. Bowel sounds are normal.   Genitourinary: Vagina normal and uterus normal.   Neurological: She is alert and oriented to person, place, and time.   Skin: Skin is warm and dry.   Psychiatric: She has a normal mood and affect. Her behavior is normal. Judgment and thought content normal.   Nursing note and vitals reviewed.              Assessment/Plan:     1. LGSIL on Pap smear of cervix  Pap done today    2. Encounter for supervision of normal first pregnancy in third trimester  resolved    3. Delivery of pregnancy by  section  Normal postpartum exam today

## 2019-07-24 NOTE — PROGRESS NOTES
Pt here today for postpartum exam.  Operation Date: 06/06/2019  Currently:Bottle   BCM: Mirena, [facility].   Good ph:[phone]  Pt states no concerns

## 2019-07-25 DIAGNOSIS — R87.612 LGSIL ON PAP SMEAR OF CERVIX: ICD-10-CM

## 2019-08-01 DIAGNOSIS — R87.612 LGSIL ON PAP SMEAR OF CERVIX: ICD-10-CM

## 2019-08-02 ENCOUNTER — GYNECOLOGY VISIT (OUTPATIENT)
Dept: OBGYN | Facility: MEDICAL CENTER | Age: 29
End: 2019-08-02
Payer: COMMERCIAL

## 2019-08-02 VITALS — DIASTOLIC BLOOD PRESSURE: 60 MMHG | WEIGHT: 187 LBS | BODY MASS INDEX: 30.18 KG/M2 | SYSTOLIC BLOOD PRESSURE: 102 MMHG

## 2019-08-02 DIAGNOSIS — Z30.430 ENCOUNTER FOR IUD INSERTION: Primary | ICD-10-CM

## 2019-08-02 LAB
INT CON NEG: NEGATIVE
INT CON POS: POSITIVE
POC URINE PREGNANCY TEST: NORMAL

## 2019-08-02 PROCEDURE — 81025 URINE PREGNANCY TEST: CPT | Performed by: NURSE PRACTITIONER

## 2019-08-02 PROCEDURE — 58300 INSERT INTRAUTERINE DEVICE: CPT | Performed by: NURSE PRACTITIONER

## 2019-08-02 NOTE — PROCEDURES
IUD Insertion  Date/Time: 8/2/2019 2:26 PM  Performed by: Rose Bojorquez D.N.P.  Authorized by: Rose Bojorquez D.N.P.     Consent:     Consent obtained:  Verbal and written    Consent given by:  Patient    Procedure risks and benefits discussed: yes      Patient questions answered: yes      Patient agrees, verbalizes understanding, and wants to proceed: yes      Educational handouts given: yes      Instructions and paperwork completed: yes    Pre-procedure details:     Negative GC/chlamydia test: yes      Negative urine pregnancy test: yes      Negative serum pregnancy test: no    Procedure:     Pelvic exam performed: yes      Sterile speculum placed in vagina: yes      Cervix visualized: yes      Cervix cleaned and prepped in sterile fashion: yes      Tenaculum applied to cervix: yes      Dilation needed: yes      Uterus sounded: yes      Uterus sound depth (cm):  7    IUD type:  Mirena    Strings trimmed: yes    Post-procedure:     Patient tolerated procedure well: yes      Patient will follow up after next period: yes

## 2019-08-07 ENCOUNTER — TELEPHONE (OUTPATIENT)
Dept: OBGYN | Facility: MEDICAL CENTER | Age: 29
End: 2019-08-07

## 2019-08-07 NOTE — TELEPHONE ENCOUNTER
----- Message from Rose Bojorquez D.N.P. sent at 8/1/2019  7:40 AM PDT -----  Can you please call this patient and help her schedule a colposcopy. She has high risk pap.    Unable to contact pt, msg left to call back.

## 2019-08-07 NOTE — TELEPHONE ENCOUNTER
Pt called back, notified her of pap results and + BV. Pt stated he has had Hx of abnormal pap and is familiar with procedure. Pt denies BV symptoms, adv pt to let us know if she starts. Pt voiced understanding and had no questions or concerns, call transferred to Neelam to schedule appt

## 2019-09-06 ENCOUNTER — GYNECOLOGY VISIT (OUTPATIENT)
Dept: OBGYN | Facility: MEDICAL CENTER | Age: 29
End: 2019-09-06
Payer: COMMERCIAL

## 2019-09-06 VITALS — WEIGHT: 190 LBS | DIASTOLIC BLOOD PRESSURE: 70 MMHG | BODY MASS INDEX: 30.67 KG/M2 | SYSTOLIC BLOOD PRESSURE: 110 MMHG

## 2019-09-06 DIAGNOSIS — Z30.431 IUD CHECK UP: ICD-10-CM

## 2019-09-06 PROCEDURE — 99211 OFF/OP EST MAY X REQ PHY/QHP: CPT | Performed by: NURSE PRACTITIONER

## 2019-09-06 NOTE — PROGRESS NOTES
S) Mirena check placement on 8/2. Patient and FOB state stings poke them and are too long. Abnormal bleeding but otherwise no problems. No cramping.   O) /70       Weight 190        Strings clearly visualized at cervical os  A) Mirena IUD in situ  P) strings trimmed. Follow up PRN.

## 2019-10-04 ENCOUNTER — GYNECOLOGY VISIT (OUTPATIENT)
Dept: OBGYN | Facility: MEDICAL CENTER | Age: 29
End: 2019-10-04
Payer: COMMERCIAL

## 2019-10-04 VITALS — SYSTOLIC BLOOD PRESSURE: 100 MMHG | BODY MASS INDEX: 30.99 KG/M2 | DIASTOLIC BLOOD PRESSURE: 80 MMHG | WEIGHT: 192 LBS

## 2019-10-04 DIAGNOSIS — T83.32XS INTRAUTERINE CONTRACEPTIVE DEVICE THREADS LOST, SEQUELA: ICD-10-CM

## 2019-10-04 DIAGNOSIS — T83.32XA INTRAUTERINE CONTRACEPTIVE DEVICE THREADS LOST, INITIAL ENCOUNTER: Primary | ICD-10-CM

## 2019-10-04 PROCEDURE — 90040 PR PRENATAL FOLLOW UP: CPT | Performed by: NURSE PRACTITIONER

## 2019-10-04 RX ORDER — NORGESTIMATE AND ETHINYL ESTRADIOL 0.25-0.035
1 KIT ORAL DAILY
Qty: 28 TAB | Refills: 0 | Status: SHIPPED | OUTPATIENT
Start: 2019-10-04 | End: 2020-05-30

## 2019-10-04 NOTE — PROGRESS NOTES
S) patient is here for IUD check up. States heavy menses that just does not stop. This is very inconveniencing and annoying. We trimmed her strings las visit because they were reported as too long and she was uncomfortable with them.   O) /80        Weight 192         Heavy menses          Strings not visible at cervical os or with swabbing at os  A) Strings not apparent       Heavy menses  P) I cannot be sure IUD is in proper place. Patient definitely wants to be sure it was not expelled. Does not want unintended pregnancy. Use condoms for back up until verified. For menses may try one cycle of OCP. She has no contraindications to estrogen products.

## 2019-10-10 ENCOUNTER — HOSPITAL ENCOUNTER (OUTPATIENT)
Dept: RADIOLOGY | Facility: MEDICAL CENTER | Age: 29
End: 2019-10-10
Attending: NURSE PRACTITIONER
Payer: COMMERCIAL

## 2019-10-10 DIAGNOSIS — T83.32XA INTRAUTERINE CONTRACEPTIVE DEVICE THREADS LOST, INITIAL ENCOUNTER: ICD-10-CM

## 2019-10-10 PROCEDURE — 76830 TRANSVAGINAL US NON-OB: CPT

## 2020-01-21 ENCOUNTER — GYNECOLOGY VISIT (OUTPATIENT)
Dept: OBGYN | Facility: CLINIC | Age: 30
End: 2020-01-21
Payer: COMMERCIAL

## 2020-01-21 ENCOUNTER — HOSPITAL ENCOUNTER (OUTPATIENT)
Dept: LAB | Facility: MEDICAL CENTER | Age: 30
End: 2020-01-21
Attending: OBSTETRICS & GYNECOLOGY
Payer: COMMERCIAL

## 2020-01-21 VITALS — BODY MASS INDEX: 32.12 KG/M2 | WEIGHT: 199 LBS | DIASTOLIC BLOOD PRESSURE: 71 MMHG | SYSTOLIC BLOOD PRESSURE: 125 MMHG

## 2020-01-21 DIAGNOSIS — R87.612 LGSIL ON PAP SMEAR OF CERVIX: ICD-10-CM

## 2020-01-21 LAB
INT CON NEG: NEGATIVE
INT CON POS: POSITIVE
PATHOLOGY CONSULT NOTE: NORMAL
POC URINE PREGNANCY TEST: NEGATIVE

## 2020-01-21 PROCEDURE — 88305 TISSUE EXAM BY PATHOLOGIST: CPT | Mod: 59

## 2020-01-21 PROCEDURE — 57454 BX/CURETT OF CERVIX W/SCOPE: CPT | Performed by: OBSTETRICS & GYNECOLOGY

## 2020-01-21 PROCEDURE — 81025 URINE PREGNANCY TEST: CPT | Performed by: OBSTETRICS & GYNECOLOGY

## 2020-01-21 NOTE — LETTER
COLPOSCOPY / LEEP DATA SHEET    Leslie Loja     1990      29 y.o.      No LMP recorded. Patient has had an implant.     @EDC@       Medical history:   o MVP    o Blood dyscrasia    o Rh     o Other: .    STD history:    o HPV     o HSV     o HIV     o GC     o Chlamydia     o None     o Other: .    HIV:   o Positive     o Negative                            IV Drug User:   o  Yes    o  No .    Age of first coitus:                                                Number of sex partners in lifetime:  .    Reason for exam:.    Prior abnormal PAPS?    o  Yes  o  No        Treatment: .    Prior history of condyloma?    o  Yes  o  No        Treatment:.     Colposcopic view - description:                                 Satisfactory?    o  Yes  o  No                    Squamo-columnar junction seen?  o  Yes  o  No.    Entire lesion seen?     o  Yes  o  No          PAP done?  o  Yes  o  No           Biopsies done?  o  Yes  o  No.    Biopsy sites:.    ECC done?    o  Yes  o  No      If no, reason:.    Impression:.    Recommendations:.             Colposcopist: ______________________________________________ Date: ___________________

## 2020-01-21 NOTE — PROCEDURES
Procedure note; COLPOSCOPY    Indications; Pap smear; LGSIL    Patient using Mirena IUD for birth control    Informed consent obtained, consent signed    Urine pregnancy test negative    Vaginal speculum placed    3% acetic acid solution applied to cervix    Coloscopy performed    Entire transformation zone visualized    Findings; acetowhite epithelium with mosaicism and punctation at 12:00 biopsy removed most of lesion      Biopsies taken;    Endocervical curettage; taken  Ectocervical biopsies; 12:00    Specimen sent to pathology    Patient to make appointment with me to review results of biopsies    Nito Wakefield MD

## 2020-02-07 ENCOUNTER — GYNECOLOGY VISIT (OUTPATIENT)
Dept: OBGYN | Facility: CLINIC | Age: 30
End: 2020-02-07
Payer: COMMERCIAL

## 2020-02-07 VITALS — WEIGHT: 202 LBS | SYSTOLIC BLOOD PRESSURE: 114 MMHG | BODY MASS INDEX: 32.6 KG/M2 | DIASTOLIC BLOOD PRESSURE: 71 MMHG

## 2020-02-07 DIAGNOSIS — B97.7 HPV (HUMAN PAPILLOMA VIRUS) INFECTION: ICD-10-CM

## 2020-02-07 DIAGNOSIS — R87.612 LGSIL ON PAP SMEAR OF CERVIX: ICD-10-CM

## 2020-02-07 PROCEDURE — 99213 OFFICE O/P EST LOW 20 MIN: CPT | Performed by: OBSTETRICS & GYNECOLOGY

## 2020-02-07 NOTE — NON-PROVIDER
Pt here for colposcopy results   Pt states no complaints    Good # [phone]  Pharmacy verified.

## 2020-02-07 NOTE — PROGRESS NOTES
Chief complaint;    Leslie Loja is a 29 y.o.  who presents to discuss results of colposcopy patient had Pap smear showing LGSIL-colposcopy showed negative ECC and biopsy at 12:00 showed HPV only    Review of systems; denies fever chills abdominal pain, denies chest pain shortness of breath or urinary symptoms  Past medical history-  Past Medical History:   Diagnosis Date   • Gynecological disorder     Abnormal Pap     Past surgical history-  Past Surgical History:   Procedure Laterality Date   • PB  DELIVERY ONLY  2019    Procedure:  SECTION, PRIMARY;  Surgeon: Vahe Lindo M.D.;  Location: LABOR AND DELIVERY;  Service: Labor and Delivery     Allergies-Patient has no known allergies.  Medications-  Current Outpatient Medications on File Prior to Visit   Medication Sig Dispense Refill   • norgestimate-ethinyl estradiol (ORTHO-CYCLEN) 0.25-35 MG-MCG per tablet Take 1 Tab by mouth every day. 28 Tab 0   • acetaminophen (TYLENOL) 325 MG Tab Take 2 Tabs by mouth every four hours as needed. (Patient not taking: Reported on 2019) 30 Tab 0   • docusate sodium 100 MG Cap Take 100 mg by mouth 2 times a day as needed for Constipation. (Patient not taking: Reported on 2019) 60 Cap 0   • ibuprofen (MOTRIN) 800 MG Tab Take 1 Tab by mouth every 8 hours as needed (For cramping after delivery; do not give if patient is receiving ketorolac (Toradol)). (Patient not taking: Reported on 2019) 30 Tab 0   • Prenatal MV-Min-Fe Fum-FA-DHA (PRENATAL 1 PO) Take  by mouth.       No current facility-administered medications on file prior to visit.      Social history-  Social History     Socioeconomic History   • Marital status:      Spouse name: Not on file   • Number of children: Not on file   • Years of education: Not on file   • Highest education level: Not on file   Occupational History   • Not on file   Social Needs   • Financial resource strain: Not on file   • Food insecurity:      Worry: Not on file     Inability: Not on file   • Transportation needs:     Medical: Not on file     Non-medical: Not on file   Tobacco Use   • Smoking status: Never Smoker   • Smokeless tobacco: Never Used   Substance and Sexual Activity   • Alcohol use: No   • Drug use: No   • Sexual activity: Yes     Partners: Male     Birth control/protection: Pill     Comment: Plans pills PP   Lifestyle   • Physical activity:     Days per week: Not on file     Minutes per session: Not on file   • Stress: Not on file   Relationships   • Social connections:     Talks on phone: Not on file     Gets together: Not on file     Attends Restoration service: Not on file     Active member of club or organization: Not on file     Attends meetings of clubs or organizations: Not on file     Relationship status: Not on file   • Intimate partner violence:     Fear of current or ex partner: Not on file     Emotionally abused: Not on file     Physically abused: Not on file     Forced sexual activity: Not on file   Other Topics Concern   • Not on file   Social History Narrative   • Not on file     Past Family History-no history of breast or ovarian cancer    Physical examination;  Alert and oriented x3  General a thin well-developed well-nourished female in no apparent distress  Vitals:    02/07/20 1501   BP: 114/71   Weight: 91.6 kg (202 lb)         Impression;  LGSIL on Pap smear-colposcopy shows HPV    Plan;  Recommend Pap smear in 1 year  Discussed results in extensive detail      15  Minutes spent with the patient in face-to-face contact,  100% of the time spent on counseling and coordination of care. All questions answered in detail.

## 2020-05-30 ENCOUNTER — HOSPITAL ENCOUNTER (EMERGENCY)
Facility: MEDICAL CENTER | Age: 30
End: 2020-05-30
Attending: EMERGENCY MEDICINE
Payer: COMMERCIAL

## 2020-05-30 ENCOUNTER — APPOINTMENT (OUTPATIENT)
Dept: RADIOLOGY | Facility: MEDICAL CENTER | Age: 30
End: 2020-05-30
Attending: EMERGENCY MEDICINE
Payer: COMMERCIAL

## 2020-05-30 VITALS
DIASTOLIC BLOOD PRESSURE: 77 MMHG | OXYGEN SATURATION: 97 % | SYSTOLIC BLOOD PRESSURE: 122 MMHG | HEIGHT: 66 IN | BODY MASS INDEX: 32.47 KG/M2 | TEMPERATURE: 97.9 F | RESPIRATION RATE: 18 BRPM | WEIGHT: 202 LBS | HEART RATE: 74 BPM

## 2020-05-30 DIAGNOSIS — S42.492A OTHER CLOSED DISPLACED FRACTURE OF DISTAL END OF LEFT HUMERUS, INITIAL ENCOUNTER: ICD-10-CM

## 2020-05-30 PROCEDURE — 96375 TX/PRO/DX INJ NEW DRUG ADDON: CPT

## 2020-05-30 PROCEDURE — 73090 X-RAY EXAM OF FOREARM: CPT | Mod: LT

## 2020-05-30 PROCEDURE — 302875 HCHG BANDAGE ACE 4 OR 6""

## 2020-05-30 PROCEDURE — 700111 HCHG RX REV CODE 636 W/ 250 OVERRIDE (IP): Performed by: EMERGENCY MEDICINE

## 2020-05-30 PROCEDURE — 96376 TX/PRO/DX INJ SAME DRUG ADON: CPT

## 2020-05-30 PROCEDURE — 29105 APPLICATION LONG ARM SPLINT: CPT

## 2020-05-30 PROCEDURE — 99284 EMERGENCY DEPT VISIT MOD MDM: CPT

## 2020-05-30 PROCEDURE — 96374 THER/PROPH/DIAG INJ IV PUSH: CPT

## 2020-05-30 PROCEDURE — 73060 X-RAY EXAM OF HUMERUS: CPT | Mod: LT

## 2020-05-30 PROCEDURE — 29505 APPLICATION LONG LEG SPLINT: CPT

## 2020-05-30 RX ORDER — IBUPROFEN 600 MG/1
600 TABLET ORAL EVERY 6 HOURS PRN
Qty: 30 TAB | Refills: 0 | Status: SHIPPED | OUTPATIENT
Start: 2020-05-30

## 2020-05-30 RX ORDER — OXYCODONE HYDROCHLORIDE AND ACETAMINOPHEN 5; 325 MG/1; MG/1
1 TABLET ORAL EVERY 8 HOURS PRN
Qty: 21 TAB | Refills: 0 | Status: SHIPPED | OUTPATIENT
Start: 2020-05-30 | End: 2020-06-06

## 2020-05-30 RX ORDER — ONDANSETRON 2 MG/ML
4 INJECTION INTRAMUSCULAR; INTRAVENOUS ONCE
Status: COMPLETED | OUTPATIENT
Start: 2020-05-30 | End: 2020-05-30

## 2020-05-30 RX ORDER — KETOROLAC TROMETHAMINE 30 MG/ML
30 INJECTION, SOLUTION INTRAMUSCULAR; INTRAVENOUS ONCE
Status: COMPLETED | OUTPATIENT
Start: 2020-05-30 | End: 2020-05-30

## 2020-05-30 RX ORDER — MORPHINE SULFATE 4 MG/ML
2 INJECTION, SOLUTION INTRAMUSCULAR; INTRAVENOUS ONCE
Status: COMPLETED | OUTPATIENT
Start: 2020-05-30 | End: 2020-05-30

## 2020-05-30 RX ORDER — MORPHINE SULFATE 4 MG/ML
4 INJECTION, SOLUTION INTRAMUSCULAR; INTRAVENOUS ONCE
Status: COMPLETED | OUTPATIENT
Start: 2020-05-30 | End: 2020-05-30

## 2020-05-30 RX ADMIN — ONDANSETRON HYDROCHLORIDE 4 MG: 2 SOLUTION INTRAMUSCULAR; INTRAVENOUS at 19:35

## 2020-05-30 RX ADMIN — MORPHINE SULFATE 2 MG: 4 INJECTION INTRAVENOUS at 21:15

## 2020-05-30 RX ADMIN — KETOROLAC TROMETHAMINE 30 MG: 30 INJECTION, SOLUTION INTRAMUSCULAR at 20:07

## 2020-05-30 RX ADMIN — MORPHINE SULFATE 4 MG: 4 INJECTION INTRAVENOUS at 19:35

## 2020-05-31 NOTE — ED NOTES
Discharge instructions reviewed with pt. All questions answered and pt verbalizes understanding. Pt was wheeled to exit where  is picking her up

## 2020-05-31 NOTE — ED TRIAGE NOTES
"Leslie Loja 29 y.o. female to triage via w/c for     Chief Complaint   Patient presents with   • Shoulder Injury     pt reports she was running to her crying baby and tripped on a toy landing on her L side.  Pt c/o L upper arm/shoulder pain.  Pt also landed on the L side of her face.  Denies LOC.   • T-5000 FALL     Pt appears in a lot of pain, has L arm in sling, tearful and guarding.  Charge RN aware of patient.  /79   Pulse 83   Temp 36.3 °C (97.4 °F) (Temporal)   Resp 16   Ht 1.676 m (5' 6\")   Wt 91.6 kg (202 lb)   SpO2 98%   BMI 32.60 kg/m²     "

## 2020-05-31 NOTE — ED PROVIDER NOTES
ED Provider Note    CHIEF COMPLAINT   Chief Complaint   Patient presents with   • Shoulder Injury     pt reports she was running to her crying baby and tripped on a toy landing on her L side.  Pt c/o L upper arm/shoulder pain.  Pt also landed on the L side of her face.  Denies LOC.   • T-5000 FALL       HPI   Leslie Loja is a 29 y.o. female who presents to emergency room today with injury to her left arm.  Patient states that just prior to being seen emergency room she was running to her crying baby and fell on her left arm causing injury.  She denies any head or neck pain pain is localized over lower portion of her upper arm just above the elbow rated at a 10/10 with no numbness or tingling but she has difficulty moving it because of the pain.    REVIEW OF SYSTEMS   See HPI for further details. All other systems are negative.     PAST MEDICAL HISTORY   Past Medical History:   Diagnosis Date   • Gynecological disorder     Abnormal Pap       FAMILY HISTORY  No family history on file.    SOCIAL HISTORY  Social History     Socioeconomic History   • Marital status:      Spouse name: Not on file   • Number of children: Not on file   • Years of education: Not on file   • Highest education level: Not on file   Occupational History   • Not on file   Social Needs   • Financial resource strain: Not on file   • Food insecurity     Worry: Not on file     Inability: Not on file   • Transportation needs     Medical: Not on file     Non-medical: Not on file   Tobacco Use   • Smoking status: Never Smoker   • Smokeless tobacco: Never Used   Substance and Sexual Activity   • Alcohol use: No   • Drug use: No   • Sexual activity: Yes     Partners: Male     Birth control/protection: Pill     Comment: Plans pills PP   Lifestyle   • Physical activity     Days per week: Not on file     Minutes per session: Not on file   • Stress: Not on file   Relationships   • Social connections     Talks on phone: Not on file     Gets  "together: Not on file     Attends Judaism service: Not on file     Active member of club or organization: Not on file     Attends meetings of clubs or organizations: Not on file     Relationship status: Not on file   • Intimate partner violence     Fear of current or ex partner: Not on file     Emotionally abused: Not on file     Physically abused: Not on file     Forced sexual activity: Not on file   Other Topics Concern   • Not on file   Social History Narrative   • Not on file       SURGICAL HISTORY  Past Surgical History:   Procedure Laterality Date   • PB  DELIVERY ONLY  2019    Procedure:  SECTION, PRIMARY;  Surgeon: Vahe Lindo M.D.;  Location: LABOR AND DELIVERY;  Service: Labor and Delivery       CURRENT MEDICATIONS   Home Medications     Reviewed by Sarah Khan R.N. (Registered Nurse) on 20 at 1834  Med List Status: Complete   Medication Last Dose Status   acetaminophen (TYLENOL) 325 MG Tab 2020 Active                ALLERGIES   No Known Allergies    PHYSICAL EXAM  VITAL SIGNS: /77   Pulse 74   Temp 36.6 °C (97.9 °F)   Resp 18   Ht 1.676 m (5' 6\")   Wt 91.6 kg (202 lb)   SpO2 97%   BMI 32.60 kg/m²       Constitutional: Well developed, Well nourished, No acute distress, Non-toxic appearance.   Cardiovascular: Normal heart rate, Normal rhythm, No murmurs, No rubs, No gallops.   Thorax & Lungs: Normal breath sounds, No respiratory distress, No wheezing, No chest tenderness.   Abdomen: Bowel sounds normal, Soft, No tenderness, No masses, No pulsatile masses.   Skin: Warm, Dry, No erythema, No rash.   Extremities: Intact distal pulses, No cyanosis, No clubbing.   Musculoskeletal: Formerly noted over the left humerus area distal pulses sensation intact distally range of motion is somewhat limited secondary to the pain.  Neurologic: Alert & oriented x 3, Normal motor function, Normal sensory function, No focal deficits noted. "     RADIOLOGY/PROCEDURES  DX-FOREARM LEFT   Final Result      1.  There is no acute left forearm fracture.   2.  There is partial visualization of a distal left humerus fracture.      DX-HUMERUS 2+ LEFT   Final Result      1.  There is a mildly displaced fracture of the distal left humeral diaphysis.            COURSE & MEDICAL DECISION MAKING  Pertinent Labs & Imaging studies reviewed. (See chart for details)  She has a distal humerus fracture on the left comminuted discussed case with Dr. Saul who will follow patient in the office she was placed in OCL splint and arm sling given pain medication with good relief of her pain will continue this at home she understands this is opiate-based and will try Motrin first ice it elevation.  Opiates can be addictive no operating heavy machinery use of alcohol with medication.  Return if further problems otherwise will contact renal orthopedic clinic on Monday for follow-up.  Verbalizes understanding above instructions.    FINAL IMPRESSION  1.  Acute comminuted fracture left distal humerus  2.   3.      Electronically signed by: Enoc Randall D.O., 5/30/2020 11:38 PM

## 2020-06-02 ENCOUNTER — ANESTHESIA (OUTPATIENT)
Dept: SURGERY | Facility: MEDICAL CENTER | Age: 30
End: 2020-06-02
Payer: COMMERCIAL

## 2020-06-02 ENCOUNTER — APPOINTMENT (OUTPATIENT)
Dept: RADIOLOGY | Facility: MEDICAL CENTER | Age: 30
End: 2020-06-02
Attending: ORTHOPAEDIC SURGERY
Payer: COMMERCIAL

## 2020-06-02 ENCOUNTER — HOSPITAL ENCOUNTER (OUTPATIENT)
Facility: MEDICAL CENTER | Age: 30
End: 2020-06-02
Attending: ORTHOPAEDIC SURGERY | Admitting: ORTHOPAEDIC SURGERY
Payer: COMMERCIAL

## 2020-06-02 ENCOUNTER — ANESTHESIA EVENT (OUTPATIENT)
Dept: SURGERY | Facility: MEDICAL CENTER | Age: 30
End: 2020-06-02
Payer: COMMERCIAL

## 2020-06-02 VITALS
BODY MASS INDEX: 33.27 KG/M2 | RESPIRATION RATE: 16 BRPM | OXYGEN SATURATION: 94 % | HEIGHT: 66 IN | DIASTOLIC BLOOD PRESSURE: 76 MMHG | SYSTOLIC BLOOD PRESSURE: 107 MMHG | HEART RATE: 94 BPM | WEIGHT: 207.01 LBS | TEMPERATURE: 97.8 F

## 2020-06-02 DIAGNOSIS — G89.18 ACUTE POST-OPERATIVE PAIN: ICD-10-CM

## 2020-06-02 LAB
COVID ORDER STATUS COVID19: NORMAL
HCG UR QL: NEGATIVE
SARS-COV-2 RNA RESP QL NAA+PROBE: NOTDETECTED
SPECIMEN SOURCE: NORMAL

## 2020-06-02 PROCEDURE — 160047 HCHG PACU  - EA ADDL 30 MINS PHASE II: Performed by: ORTHOPAEDIC SURGERY

## 2020-06-02 PROCEDURE — 700105 HCHG RX REV CODE 258: Performed by: ORTHOPAEDIC SURGERY

## 2020-06-02 PROCEDURE — 160041 HCHG SURGERY MINUTES - EA ADDL 1 MIN LEVEL 4: Performed by: ORTHOPAEDIC SURGERY

## 2020-06-02 PROCEDURE — 501838 HCHG SUTURE GENERAL: Performed by: ORTHOPAEDIC SURGERY

## 2020-06-02 PROCEDURE — 160035 HCHG PACU - 1ST 60 MINS PHASE I: Performed by: ORTHOPAEDIC SURGERY

## 2020-06-02 PROCEDURE — 160048 HCHG OR STATISTICAL LEVEL 1-5: Performed by: ORTHOPAEDIC SURGERY

## 2020-06-02 PROCEDURE — 700111 HCHG RX REV CODE 636 W/ 250 OVERRIDE (IP)

## 2020-06-02 PROCEDURE — 160036 HCHG PACU - EA ADDL 30 MINS PHASE I: Performed by: ORTHOPAEDIC SURGERY

## 2020-06-02 PROCEDURE — A9270 NON-COVERED ITEM OR SERVICE: HCPCS | Performed by: ANESTHESIOLOGY

## 2020-06-02 PROCEDURE — 700111 HCHG RX REV CODE 636 W/ 250 OVERRIDE (IP): Performed by: ANESTHESIOLOGY

## 2020-06-02 PROCEDURE — 700102 HCHG RX REV CODE 250 W/ 637 OVERRIDE(OP): Performed by: ANESTHESIOLOGY

## 2020-06-02 PROCEDURE — 700101 HCHG RX REV CODE 250: Performed by: ORTHOPAEDIC SURGERY

## 2020-06-02 PROCEDURE — U0004 COV-19 TEST NON-CDC HGH THRU: HCPCS

## 2020-06-02 PROCEDURE — C9803 HOPD COVID-19 SPEC COLLECT: HCPCS | Performed by: ORTHOPAEDIC SURGERY

## 2020-06-02 PROCEDURE — 160046 HCHG PACU - 1ST 60 MINS PHASE II: Performed by: ORTHOPAEDIC SURGERY

## 2020-06-02 PROCEDURE — C1713 ANCHOR/SCREW BN/BN,TIS/BN: HCPCS | Performed by: ORTHOPAEDIC SURGERY

## 2020-06-02 PROCEDURE — 700101 HCHG RX REV CODE 250: Performed by: ANESTHESIOLOGY

## 2020-06-02 PROCEDURE — 73060 X-RAY EXAM OF HUMERUS: CPT | Mod: LT

## 2020-06-02 PROCEDURE — 700111 HCHG RX REV CODE 636 W/ 250 OVERRIDE (IP): Performed by: ORTHOPAEDIC SURGERY

## 2020-06-02 PROCEDURE — 160009 HCHG ANES TIME/MIN: Performed by: ORTHOPAEDIC SURGERY

## 2020-06-02 PROCEDURE — 700102 HCHG RX REV CODE 250 W/ 637 OVERRIDE(OP): Performed by: ORTHOPAEDIC SURGERY

## 2020-06-02 PROCEDURE — 160025 RECOVERY II MINUTES (STATS): Performed by: ORTHOPAEDIC SURGERY

## 2020-06-02 PROCEDURE — 81025 URINE PREGNANCY TEST: CPT

## 2020-06-02 PROCEDURE — 160002 HCHG RECOVERY MINUTES (STAT): Performed by: ORTHOPAEDIC SURGERY

## 2020-06-02 PROCEDURE — A9270 NON-COVERED ITEM OR SERVICE: HCPCS | Performed by: ORTHOPAEDIC SURGERY

## 2020-06-02 PROCEDURE — A4565 SLINGS: HCPCS | Performed by: ORTHOPAEDIC SURGERY

## 2020-06-02 PROCEDURE — 501445 HCHG STAPLER, SKIN DISP: Performed by: ORTHOPAEDIC SURGERY

## 2020-06-02 PROCEDURE — A6454 SELF-ADHER BAND W>=3" <5"/YD: HCPCS | Performed by: ORTHOPAEDIC SURGERY

## 2020-06-02 PROCEDURE — 160029 HCHG SURGERY MINUTES - 1ST 30 MINS LEVEL 4: Performed by: ORTHOPAEDIC SURGERY

## 2020-06-02 DEVICE — IMPLANTABLE DEVICE: Type: IMPLANTABLE DEVICE | Site: ARM | Status: FUNCTIONAL

## 2020-06-02 DEVICE — SCREW CORTEX SELF-TAPPING PERI-LOC T20 3.5MM X 18MM (1TX8+1TX8=16): Type: IMPLANTABLE DEVICE | Site: ARM | Status: FUNCTIONAL

## 2020-06-02 DEVICE — SCREW CORTEX SELF-TAPPING PERI-LOC T20 3.5MM X 16MM (1TX8+1TX8=16): Type: IMPLANTABLE DEVICE | Site: ARM | Status: FUNCTIONAL

## 2020-06-02 RX ORDER — DEXAMETHASONE SODIUM PHOSPHATE 4 MG/ML
INJECTION, SOLUTION INTRA-ARTICULAR; INTRALESIONAL; INTRAMUSCULAR; INTRAVENOUS; SOFT TISSUE PRN
Status: DISCONTINUED | OUTPATIENT
Start: 2020-06-02 | End: 2020-06-02 | Stop reason: SURG

## 2020-06-02 RX ORDER — BUPIVACAINE HYDROCHLORIDE 5 MG/ML
INJECTION, SOLUTION EPIDURAL; INTRACAUDAL
Status: DISCONTINUED | OUTPATIENT
Start: 2020-06-02 | End: 2020-06-02 | Stop reason: HOSPADM

## 2020-06-02 RX ORDER — ROCURONIUM BROMIDE 10 MG/ML
INJECTION, SOLUTION INTRAVENOUS PRN
Status: DISCONTINUED | OUTPATIENT
Start: 2020-06-02 | End: 2020-06-02 | Stop reason: SURG

## 2020-06-02 RX ORDER — ONDANSETRON 2 MG/ML
INJECTION INTRAMUSCULAR; INTRAVENOUS PRN
Status: DISCONTINUED | OUTPATIENT
Start: 2020-06-02 | End: 2020-06-02 | Stop reason: SURG

## 2020-06-02 RX ORDER — SODIUM CHLORIDE, SODIUM LACTATE, POTASSIUM CHLORIDE, CALCIUM CHLORIDE 600; 310; 30; 20 MG/100ML; MG/100ML; MG/100ML; MG/100ML
INJECTION, SOLUTION INTRAVENOUS CONTINUOUS
Status: DISCONTINUED | OUTPATIENT
Start: 2020-06-02 | End: 2020-06-02 | Stop reason: HOSPADM

## 2020-06-02 RX ORDER — DIPHENHYDRAMINE HYDROCHLORIDE 50 MG/ML
12.5 INJECTION INTRAMUSCULAR; INTRAVENOUS
Status: DISCONTINUED | OUTPATIENT
Start: 2020-06-02 | End: 2020-06-02 | Stop reason: HOSPADM

## 2020-06-02 RX ORDER — MIDAZOLAM HYDROCHLORIDE 1 MG/ML
1 INJECTION INTRAMUSCULAR; INTRAVENOUS
Status: DISCONTINUED | OUTPATIENT
Start: 2020-06-02 | End: 2020-06-02 | Stop reason: HOSPADM

## 2020-06-02 RX ORDER — HYDROMORPHONE HYDROCHLORIDE 1 MG/ML
0.4 INJECTION, SOLUTION INTRAMUSCULAR; INTRAVENOUS; SUBCUTANEOUS
Status: DISCONTINUED | OUTPATIENT
Start: 2020-06-02 | End: 2020-06-02 | Stop reason: HOSPADM

## 2020-06-02 RX ORDER — MEPERIDINE HYDROCHLORIDE 25 MG/ML
6.25 INJECTION INTRAMUSCULAR; INTRAVENOUS; SUBCUTANEOUS
Status: DISCONTINUED | OUTPATIENT
Start: 2020-06-02 | End: 2020-06-02 | Stop reason: HOSPADM

## 2020-06-02 RX ORDER — HALOPERIDOL 5 MG/ML
1 INJECTION INTRAMUSCULAR
Status: DISCONTINUED | OUTPATIENT
Start: 2020-06-02 | End: 2020-06-02 | Stop reason: HOSPADM

## 2020-06-02 RX ORDER — MIDAZOLAM HYDROCHLORIDE 1 MG/ML
INJECTION INTRAMUSCULAR; INTRAVENOUS PRN
Status: DISCONTINUED | OUTPATIENT
Start: 2020-06-02 | End: 2020-06-02 | Stop reason: SURG

## 2020-06-02 RX ORDER — HYDROMORPHONE HYDROCHLORIDE 1 MG/ML
0.1 INJECTION, SOLUTION INTRAMUSCULAR; INTRAVENOUS; SUBCUTANEOUS
Status: DISCONTINUED | OUTPATIENT
Start: 2020-06-02 | End: 2020-06-02 | Stop reason: HOSPADM

## 2020-06-02 RX ORDER — HYDROMORPHONE HYDROCHLORIDE 1 MG/ML
0.2 INJECTION, SOLUTION INTRAMUSCULAR; INTRAVENOUS; SUBCUTANEOUS
Status: DISCONTINUED | OUTPATIENT
Start: 2020-06-02 | End: 2020-06-02 | Stop reason: HOSPADM

## 2020-06-02 RX ORDER — MULTIVIT WITH MINERALS/LUTEIN
1 TABLET ORAL DAILY
COMMUNITY

## 2020-06-02 RX ORDER — LIDOCAINE HYDROCHLORIDE 20 MG/ML
INJECTION, SOLUTION EPIDURAL; INFILTRATION; INTRACAUDAL; PERINEURAL PRN
Status: DISCONTINUED | OUTPATIENT
Start: 2020-06-02 | End: 2020-06-02 | Stop reason: SURG

## 2020-06-02 RX ORDER — ACETAMINOPHEN 500 MG
500-1000 TABLET ORAL EVERY 6 HOURS PRN
COMMUNITY

## 2020-06-02 RX ORDER — ONDANSETRON 2 MG/ML
4 INJECTION INTRAMUSCULAR; INTRAVENOUS
Status: COMPLETED | OUTPATIENT
Start: 2020-06-02 | End: 2020-06-02

## 2020-06-02 RX ORDER — MAGNESIUM HYDROXIDE 1200 MG/15ML
LIQUID ORAL
Status: COMPLETED | OUTPATIENT
Start: 2020-06-02 | End: 2020-06-02

## 2020-06-02 RX ORDER — OXYCODONE HYDROCHLORIDE AND ACETAMINOPHEN 5; 325 MG/1; MG/1
2 TABLET ORAL
Status: COMPLETED | OUTPATIENT
Start: 2020-06-02 | End: 2020-06-02

## 2020-06-02 RX ORDER — CEFAZOLIN SODIUM 1 G/3ML
INJECTION, POWDER, FOR SOLUTION INTRAMUSCULAR; INTRAVENOUS PRN
Status: DISCONTINUED | OUTPATIENT
Start: 2020-06-02 | End: 2020-06-02 | Stop reason: SURG

## 2020-06-02 RX ORDER — TOBRAMYCIN 1.2 G/30ML
INJECTION, POWDER, LYOPHILIZED, FOR SOLUTION INTRAVENOUS
Status: DISCONTINUED | OUTPATIENT
Start: 2020-06-02 | End: 2020-06-02 | Stop reason: HOSPADM

## 2020-06-02 RX ORDER — OXYCODONE HYDROCHLORIDE 5 MG/1
5 TABLET ORAL EVERY 4 HOURS PRN
Qty: 40 TAB | Refills: 0 | Status: SHIPPED | OUTPATIENT
Start: 2020-06-02 | End: 2020-06-09

## 2020-06-02 RX ORDER — KETAMINE HYDROCHLORIDE 50 MG/ML
INJECTION, SOLUTION INTRAMUSCULAR; INTRAVENOUS PRN
Status: DISCONTINUED | OUTPATIENT
Start: 2020-06-02 | End: 2020-06-02 | Stop reason: SURG

## 2020-06-02 RX ORDER — OXYCODONE HYDROCHLORIDE AND ACETAMINOPHEN 5; 325 MG/1; MG/1
1 TABLET ORAL
Status: COMPLETED | OUTPATIENT
Start: 2020-06-02 | End: 2020-06-02

## 2020-06-02 RX ORDER — KETOROLAC TROMETHAMINE 30 MG/ML
INJECTION, SOLUTION INTRAMUSCULAR; INTRAVENOUS PRN
Status: DISCONTINUED | OUTPATIENT
Start: 2020-06-02 | End: 2020-06-02 | Stop reason: SURG

## 2020-06-02 RX ORDER — VANCOMYCIN HYDROCHLORIDE 500 MG/10ML
INJECTION, POWDER, LYOPHILIZED, FOR SOLUTION INTRAVENOUS
Status: COMPLETED | OUTPATIENT
Start: 2020-06-02 | End: 2020-06-02

## 2020-06-02 RX ADMIN — SODIUM CHLORIDE, POTASSIUM CHLORIDE, SODIUM LACTATE AND CALCIUM CHLORIDE: 600; 310; 30; 20 INJECTION, SOLUTION INTRAVENOUS at 14:22

## 2020-06-02 RX ADMIN — LIDOCAINE HYDROCHLORIDE 0.5 ML: 10 INJECTION, SOLUTION EPIDURAL; INFILTRATION; INTRACAUDAL at 14:22

## 2020-06-02 RX ADMIN — FENTANYL CITRATE 100 MCG: 50 INJECTION INTRAMUSCULAR; INTRAVENOUS at 16:53

## 2020-06-02 RX ADMIN — OXYCODONE HYDROCHLORIDE AND ACETAMINOPHEN 2 TABLET: 5; 325 TABLET ORAL at 19:15

## 2020-06-02 RX ADMIN — ROCURONIUM BROMIDE 50 MG: 10 INJECTION, SOLUTION INTRAVENOUS at 15:59

## 2020-06-02 RX ADMIN — KETOROLAC TROMETHAMINE 30 MG: 30 INJECTION, SOLUTION INTRAMUSCULAR at 16:18

## 2020-06-02 RX ADMIN — ONDANSETRON 4 MG: 2 INJECTION INTRAMUSCULAR; INTRAVENOUS at 20:06

## 2020-06-02 RX ADMIN — FENTANYL CITRATE 50 MCG: 50 INJECTION INTRAMUSCULAR; INTRAVENOUS at 19:00

## 2020-06-02 RX ADMIN — LIDOCAINE HYDROCHLORIDE 60 MG: 20 INJECTION, SOLUTION EPIDURAL; INFILTRATION; INTRACAUDAL at 15:59

## 2020-06-02 RX ADMIN — FENTANYL CITRATE 50 MCG: 50 INJECTION INTRAMUSCULAR; INTRAVENOUS at 15:59

## 2020-06-02 RX ADMIN — DEXAMETHASONE SODIUM PHOSPHATE 4 MG: 4 INJECTION, SOLUTION INTRA-ARTICULAR; INTRALESIONAL; INTRAMUSCULAR; INTRAVENOUS; SOFT TISSUE at 16:18

## 2020-06-02 RX ADMIN — FENTANYL CITRATE 50 MCG: 50 INJECTION INTRAMUSCULAR; INTRAVENOUS at 19:03

## 2020-06-02 RX ADMIN — KETAMINE HYDROCHLORIDE 50 MG: 50 INJECTION INTRAMUSCULAR; INTRAVENOUS at 16:12

## 2020-06-02 RX ADMIN — MIDAZOLAM HYDROCHLORIDE 2 MG: 1 INJECTION, SOLUTION INTRAMUSCULAR; INTRAVENOUS at 15:55

## 2020-06-02 RX ADMIN — FENTANYL CITRATE 50 MCG: 50 INJECTION INTRAMUSCULAR; INTRAVENOUS at 19:31

## 2020-06-02 RX ADMIN — ONDANSETRON 4 MG: 2 INJECTION INTRAMUSCULAR; INTRAVENOUS at 18:29

## 2020-06-02 RX ADMIN — FENTANYL CITRATE 50 MCG: 50 INJECTION INTRAMUSCULAR; INTRAVENOUS at 16:07

## 2020-06-02 RX ADMIN — FENTANYL CITRATE 50 MCG: 50 INJECTION, SOLUTION INTRAMUSCULAR; INTRAVENOUS at 19:00

## 2020-06-02 RX ADMIN — PROPOFOL 200 MG: 10 INJECTION, EMULSION INTRAVENOUS at 15:59

## 2020-06-02 RX ADMIN — POVIDONE-IODINE 15 ML: 10 SOLUTION TOPICAL at 14:23

## 2020-06-02 RX ADMIN — FENTANYL CITRATE 50 MCG: 50 INJECTION INTRAMUSCULAR; INTRAVENOUS at 19:33

## 2020-06-02 RX ADMIN — CEFAZOLIN 2 G: 330 INJECTION, POWDER, FOR SOLUTION INTRAMUSCULAR; INTRAVENOUS at 16:03

## 2020-06-02 SDOH — HEALTH STABILITY: MENTAL HEALTH: HOW OFTEN DO YOU HAVE A DRINK CONTAINING ALCOHOL?: NEVER

## 2020-06-02 SDOH — HEALTH STABILITY: MENTAL HEALTH: HOW MANY STANDARD DRINKS CONTAINING ALCOHOL DO YOU HAVE ON A TYPICAL DAY?: NOT ASKED

## 2020-06-02 SDOH — HEALTH STABILITY: MENTAL HEALTH: HOW OFTEN DO YOU HAVE 6 OR MORE DRINKS ON ONE OCCASION?: NEVER

## 2020-06-02 NOTE — ANESTHESIA PREPROCEDURE EVALUATION
Relevant Problems   No relevant active problems       Physical Exam    Airway   Mallampati: I  TM distance: >3 FB  Neck ROM: full       Cardiovascular - normal exam  Rhythm: regular  Rate: normal  (-) murmur     Dental - normal exam           Pulmonary - normal exam  Breath sounds clear to auscultation     Abdominal - normal exam     Neurological - normal exam                 Anesthesia Plan    ASA 1       Plan - general       Airway plan will be ETT        Induction: intravenous    Postoperative Plan: Postoperative administration of opioids is intended.    Pertinent diagnostic labs and testing reviewed    Informed Consent:    Anesthetic plan and risks discussed with patient.    Use of blood products discussed with: patient whom consented to blood products.

## 2020-06-02 NOTE — PROGRESS NOTES
Patient in pre-op, assessment completed, patient and  marly updated on plan of care, all questions answered, no further needs at this time, call light within reach.

## 2020-06-02 NOTE — ANESTHESIA PROCEDURE NOTES
Airway    Date/Time: 6/2/2020 4:00 PM  Performed by: Kiki Meek M.D.  Authorized by: Kiki Meek M.D.     Location:  OR  Urgency:  Elective  Difficult Airway: No    Indications for Airway Management:  Anesthesia      Spontaneous Ventilation: absent    Sedation Level:  Deep  Preoxygenated: Yes    Patient Position:  Sniffing  Mask Difficulty Assessment:  1 - vent by mask  Final Airway Type:  Endotracheal airway  Final Endotracheal Airway:  ETT  Cuffed: Yes    Technique Used for Successful ETT Placement:  Direct laryngoscopy  Devices/Methods Used in Placement:  Intubating stylet    Insertion Site:  Oral  Blade Type:  [place]  Laryngoscope Blade/Videolaryngoscope Blade Size:  3  ETT Size (mm):  7.0  Measured from:  Teeth  ETT to Teeth (cm):  21  Placement Verified by: auscultation and capnometry    Cormack-Lehane Classification:  Grade I - full view of glottis  Number of Attempts at Approach:  1

## 2020-06-03 NOTE — ANESTHESIA TIME REPORT
Anesthesia Start and Stop Event Times     Date Time Event    6/2/2020 1530 Ready for Procedure     1555 Anesthesia Start     1841 Anesthesia Stop        Responsible Staff  06/02/20    Name Role Begin End    Kiki Meek M.D. Anesth 1555 1841        Preop Diagnosis (Free Text):  Pre-op Diagnosis     OTHER DISPLACED FRACTURE OF LOWER END OF LEFT HUMERUS        Preop Diagnosis (Codes):    Post op Diagnosis  Humerus fracture      Premium Reason  A. 3PM - 7AM    Comments:

## 2020-06-03 NOTE — OR NURSING
Discharge instructions reviewed with patient and .  Patient c/o nausea, emesis x2 but states she felt much better after vomiting. Declined additional antinausea medications.   Pt given prescription for oxycodone.  IV removed, tip intact. Site CDI.  Pt discharged via wheelchair with all personal belongings after all questions were answered.

## 2020-06-03 NOTE — OP REPORT
DATE OF SERVICE:  06/02/2020    PREOPERATIVE DIAGNOSIS:  Left distal third humeral shaft fracture.    POSTOPERATIVE DIAGNOSIS:  Left distal third humeral shaft fracture.    PROCEDURE:  Open reduction and internal fixation of left humeral shaft   fracture.    SURGEON:  Quan Ye MD    ASSISTANT:  Tomi Bryan MD    ANESTHESIOLOGIST:  Kiki Meek MD    ANESTHESIA TYPE:  General.    SPECIMENS:  None.    ESTIMATED BLOOD LOSS:  100 mL    COMPLICATIONS:  None.    OPERATIVE INDICATIONS:  The patient is a pleasant 29-year-old female who   sustained a mechanical ground level fall and subsequent left distal third   humeral shaft fracture.  She was splinted.  She has a normal neurovascular   exam and skin envelope.  Radiographs demonstrated displaced comminuted left   distal third humeral shaft fracture in slight varus.  Given these findings,   she is an appropriately indicated candidate for open reduction and internal   fixation of her fracture.  I discussed the risks, benefits, and alternatives   including risk of infection, wound healing complications, neurovascular   injury, blood loss, DVT, PE, malunion, nonunion, symptomatic hardware,   stiffness, and the medical risks of anesthesia.  We discussed benefits   including improved chance of union in acceptable alignment and improved   function.  We discussed alternatives including nonoperative management.  I   discussed alternatives including bracing which she did not elect.  Her   informed consent was signed and documented.  I met with her preoperatively and   marked the operative extremity.    OPERATIVE COURSE:  She underwent general anesthesia and was positioned in the   right lateral decubitus position with the left side up.  All bony prominences   were well padded.  Left upper extremity was prepped and draped in sterile   orthopedic fashion using ChloraPrep and the surgical team scrubbed in.  A   procedural pause was conducted to verify correct patient,  correct extremity,   presence of the surgeon's initials on the operative extremity and   administration of IV antibiotics, in this case Ancef.  Following generalized   agreement, posterior approach to the left humerus was performed, incising   through skin and subcutaneous tissue sharply.  We dissected lateral to the   triceps and elevated the triceps off the intermuscular septum.  We found the   posterior antebrachial cutaneous nerve and traced this back to the radial   nerve.  The radial nerve was identified and mobilized off the humerus and   protected throughout the case.  We then debrided the fracture site and removed   a fair amount of hematoma material.  We then reduced the comminuted medial   fragment to the lateral fragment using a combination of pointed lobster claw   reduction forceps.  We then secured this with two 2.4 mm lag screws lagging by   technique.  All the clamps were removed.  We then reduced the remaining   fracture line using a combination of pointed lobster claw reduction forceps   and secured this with two 2.4 mm lag screws as well.  Excellent purchase of   all lag screws was achieved and excellent reduction was achieved.  We   confirmed our reduction fluoroscopically and then selected a [employer]   posterolateral extraarticular distal humerus plate from the MELISSA-LOC small   fragment set.  This was positioned in the wound, secured distally with a   combination of 3.5 mm nonlocking screws and 2.7 mm locking screws and   proximally with four 3.5 mm nonlocking screws.  Excellent purchase was   achieved.  The radial nerve at the end of the case was positioned over the   most distal proximal screw.  We thoroughly irrigated the wound with copious   amounts of normal saline.  Fluoroscopic imaging demonstrated anatomic   reduction of the fracture and excellent position of all hardware, and   appropriate length of all screws.  We then placed vancomycin and tobramycin   powders within the  wound and approximated the triceps to the intermuscular   septum with some 0 Vicryl sutures and then closed the subcutaneous tissue and   skin with 0 Vicryl, 2-0 Vicryl, and staples.  The wound was anesthetized with   0.5% Marcaine.  The patient was placed in soft dressing and sling and returned   to the recovery room in stable condition, sustaining no complications.    POSTOPERATIVE PLAN:  1.  Activity:  As tolerated to operative extremity.  2.  Deep venous thrombosis prophylaxis with SCDs in the facility, none upon   discharge.  3.  Discharge home when pain controlled.  Patient may have a postoperative   block if radial nerve function is intact.  4.  Follow up with [facility] in 10-14 days for suture removal and   repeat radiographs.       ____________________________________     MD CLEMENTE Higgins / NA    DD:  06/02/2020 19:02:42  DT:  06/02/2020 22:20:08    D#:  [id number]  Job#:  [id number]

## 2020-06-03 NOTE — DISCHARGE INSTRUCTIONS
ACTIVITY: Rest and take it easy for the first 24 hours.  A responsible adult is recommended to remain with you during that time.  It is normal to feel sleepy.  We encourage you to not do anything that requires balance, judgment or coordination.    MILD FLU-LIKE SYMPTOMS ARE NORMAL. YOU MAY EXPERIENCE GENERALIZED MUSCLE ACHES, THROAT IRRITATION, HEADACHE AND/OR SOME NAUSEA.    FOR 24 HOURS DO NOT:  Drive, operate machinery or run household appliances.  Drink beer or alcoholic beverages.   Make important decisions or sign legal documents.    SPECIAL INSTRUCTIONS: Weight bearing as tolerated. Wear sling for support.     DIET: To avoid nausea, slowly advance diet as tolerated, avoiding spicy or greasy foods for the first day.  Add more substantial food to your diet according to your physician's instructions.  Babies can be fed formula or breast milk as soon as they are hungry.  INCREASE FLUIDS AND FIBER TO AVOID CONSTIPATION.    SURGICAL DRESSING/BATHING: Shower with wound covered. Begin post operative day 1 (6/3)        FOLLOW-UP APPOINTMENT:  A follow-up appointment should be arranged with your doctor in 1-2 weeks; call to schedule.    You should CALL YOUR PHYSICIAN if you develop:  Fever greater than 101 degrees F.  Pain not relieved by medication, or persistent nausea or vomiting.  Excessive bleeding (blood soaking through dressing) or unexpected drainage from the wound.  Extreme redness or swelling around the incision site, drainage of pus or foul smelling drainage.  Inability to urinate or empty your bladder within 8 hours.  Problems with breathing or chest pain.    You should call 911 if you develop problems with breathing or chest pain.  If you are unable to contact your doctor or surgical center, you should go to the nearest emergency room or urgent care center.  Physician's telephone #: [phone]    If any questions arise, call your doctor.  If your doctor is not available, please feel free to call the  Surgical Center at [phone].  The Center is open Monday through Friday from 7AM to 7PM.  You can also call the HEALTH HOTLINE open 24 hours/day, 7 days/week and speak to a nurse at [phone], or toll free at [phone].    A registered nurse may call you a few days after your surgery to see how you are doing after your procedure.    MEDICATIONS: Resume taking daily medication.  Take prescribed pain medication with food.  If no medication is prescribed, you may take non-aspirin pain medication if needed.  PAIN MEDICATION CAN BE VERY CONSTIPATING.  Take a stool softener or laxative such as senokot, pericolace, or milk of magnesia if needed.    Prescription given for Oxycodone.  Last pain medication given at 7:15 PM    If your physician has prescribed pain medication that includes Acetaminophen (Tylenol), do not take additional Acetaminophen (Tylenol) while taking the prescribed medication.    Depression / Suicide Risk    As you are discharged from this [facility] facility, it is important to learn how to keep safe from harming yourself.    Recognize the warning signs:  · Abrupt changes in personality, positive or negative- including increase in energy   · Giving away possessions  · Change in eating patterns- significant weight changes-  positive or negative  · Change in sleeping patterns- unable to sleep or sleeping all the time   · Unwillingness or inability to communicate  · Depression  · Unusual sadness, discouragement and loneliness  · Talk of wanting to die  · Neglect of personal appearance   · Rebelliousness- reckless behavior  · Withdrawal from people/activities they love  · Confusion- inability to concentrate     If you or a loved one observes any of these behaviors or has concerns about self-harm, here's what you can do:  · Talk about it- your feelings and reasons for harming yourself  · Remove any means that you might use to hurt yourself (examples: pills, rope, extension cords,  firearm)  · Get professional help from the community (Mental Health, Substance Abuse, psychological counseling)  · Do not be alone:Call your Safe Contact- someone whom you trust who will be there for you.  · Call your local CRISIS HOTLINE 111-3578 or 757-145-8886  · Call your local Children's Mobile Crisis Response Team [place] (458) 233-9351 or www.[employer]  · Call the toll free National Suicide Prevention Hotlines   · National Suicide Prevention Lifeline 396-331-ESZN (8593)  · National Hope Line Network 800-SUICIDE (646-8053)

## 2020-06-03 NOTE — OR NURSING
Arrived to Phase II after report from Corinne, RN.    VSS,pt. C/o nausea. Pain 0/10. Patient stated she needed to use the restroom. Ambulated to bathroom with 1 person assist.      Surgical site dressing CDI. Pt. Able to move fingers. Circulation intact, warm to touch.   Zofran given for nausea and emesis.    Alarms on and set to appropriate parameters. Call light within reach, rounding in place. Pt.  at bedside.

## 2020-06-04 ASSESSMENT — PAIN SCALES - GENERAL: PAIN_LEVEL: 3

## 2020-06-04 NOTE — ANESTHESIA POSTPROCEDURE EVALUATION
Patient: Leslie Loja    Procedure Summary     Date:  06/02/20 Room / Location:  [facility] 09 / SURGERY [facility]    Anesthesia Start:  1555 Anesthesia Stop:  1841    Procedure:  ORIF, FRACTURE, HUMERUS (Left Arm Upper) Diagnosis:  (OTHER DISPLACED FRACTURE OF LOWER END OF LEFT HUMERUS)    Surgeon:  Quan Ye M.D. Responsible Provider:  Kiki Meek M.D.    Anesthesia Type:  general ASA Status:  1          Final Anesthesia Type: general  Last vitals  BP   Blood Pressure: 107/76    Temp   36.6 °C (97.8 °F)    Pulse   Pulse: 94   Resp   16    SpO2   94 %      Anesthesia Post Evaluation    Patient location during evaluation: PACU  Patient participation: complete - patient participated  Level of consciousness: awake and alert  Pain score: 3    Airway patency: patent  Anesthetic complications: no  Cardiovascular status: hemodynamically stable  Respiratory status: acceptable  Hydration status: euvolemic    PONV: none           Nurse Pain Score: 3 (NPRS)

## 2021-01-25 NOTE — ADDENDUM NOTE
Addended by: WENDY SHARMA on: 1/21/2020 04:11 PM     Modules accepted: Orders     Ivermectin Counseling:  Patient instructed to take medication on an empty stomach with a full glass of water.  Patient informed of potential adverse effects including but not limited to nausea, diarrhea, dizziness, itching, and swelling of the extremities or lymph nodes.  The patient verbalized understanding of the proper use and possible adverse effects of ivermectin.  All of the patient's questions and concerns were addressed.

## 2022-05-24 ENCOUNTER — GYNECOLOGY VISIT (OUTPATIENT)
Dept: OBGYN | Facility: CLINIC | Age: 32
End: 2022-05-24
Payer: COMMERCIAL

## 2022-05-24 VITALS — WEIGHT: 199 LBS | DIASTOLIC BLOOD PRESSURE: 80 MMHG | BODY MASS INDEX: 32.12 KG/M2 | SYSTOLIC BLOOD PRESSURE: 124 MMHG

## 2022-05-24 DIAGNOSIS — Z30.432 ENCOUNTER FOR IUD REMOVAL: ICD-10-CM

## 2022-05-24 PROCEDURE — 58301 REMOVE INTRAUTERINE DEVICE: CPT | Performed by: OBSTETRICS & GYNECOLOGY

## 2022-05-24 NOTE — PROCEDURES
Procedure IUD removal-Mirena    Informed consent obtained, consent signed    Bimanual exam-retroverted uterus    Diet prep to cervix and vagina    Single-tooth tenaculum placed    Walker forceps introduced into endocervix    IUD strings grasped, IUD removed intact and shown to patient    Patient tolerated procedure well    Patient declines birth control at the present time

## (undated) DEVICE — STAPLER SKIN DISP - (6/BX 10BX/CA) VISISTAT

## (undated) DEVICE — PACK ROOM TURNOVER L&D (12/CA)

## (undated) DEVICE — SUCTION INSTRUMENT YANKAUER BULBOUS TIP W/O VENT (50EA/CA)

## (undated) DEVICE — HEAD HOLDER JUNIOR/ADULT

## (undated) DEVICE — TUBING CLEARLINK DUO-VENT - C-FLO (48EA/CA)

## (undated) DEVICE — TUBE CONNECT SUCTION CLEAR 120 X 1/4" (50EA/CA)"

## (undated) DEVICE — LACTATED RINGERS INJ 1000 ML - (14EA/CA 60CA/PF)

## (undated) DEVICE — TRAY SPINAL ANESTHESIA NON-SAFETY (10/CA)

## (undated) DEVICE — NEPTUNE 4 PORT MANIFOLD - (20/PK)

## (undated) DEVICE — KIT ANESTHESIA W/CIRCUIT & 3/LT BAG W/FILTER (20EA/CA)

## (undated) DEVICE — PAD LAP STERILE 18 X 18 - (5/PK 40PK/CA)

## (undated) DEVICE — MASK ANESTHESIA ADULT  - (100/CA)

## (undated) DEVICE — CANISTER SUCTION 3000ML MECHANICAL FILTER AUTO SHUTOFF MEDI-VAC NONSTERILE LF DISP  (40EA/CA)

## (undated) DEVICE — GLOVE BIOGEL PI ORTHO SZ 8.5 PF LF (40/BX)

## (undated) DEVICE — CHLORAPREP 26 ML APPLICATOR - ORANGE TINT(25/CA)

## (undated) DEVICE — DRESSING INTERCEED ABSORBABLE ADHESION BARRIER TC7 (10EA/CA)

## (undated) DEVICE — SUTURE 2-0 CHROMIC GUT CT-1 27 (36PK/BX)"

## (undated) DEVICE — PENCIL ELECTSURG 10FT HLSTR - WITH BLADE (50EA/CA)

## (undated) DEVICE — RETRACTOR O C SECTION LRY - (5/BX)

## (undated) DEVICE — CATHETER IV NON-SAFETY 18 GA X 1 1/4 (50/BX 4BX/CA)

## (undated) DEVICE — BIT DRILL 2.7MM QC (2TX2=2TX2+1TX2=10)

## (undated) DEVICE — TOURNIQUET CUFF 24 X 4 ONE PORT - STERILE (10/BX)

## (undated) DEVICE — NEEDLE NON SAFETY HYPO 22 GA X 1 1/2 IN (100/BX)

## (undated) DEVICE — GLOVE BIOGEL SZ 7.5 SURGICAL PF LTX - (50PR/BX 4BX/CA)

## (undated) DEVICE — SLEEVE, SEQUENTIAL CALF REG

## (undated) DEVICE — SODIUM CHL IRRIGATION 0.9% 1000ML (12EA/CA)

## (undated) DEVICE — ELECTRODE DUAL RETURN W/ CORD - (50/PK)

## (undated) DEVICE — TUBE E-T HI-LO CUFF 7.0MM (10EA/PK)

## (undated) DEVICE — GLOVE SZ 6.5 BIOGEL PI MICRO - PF LF (50PR/BX)

## (undated) DEVICE — PROTECTOR ULNA NERVE - (36PR/CA)

## (undated) DEVICE — DRAPE C-ARM LARGE 41IN X 74 IN - (10/BX 2BX/CA)

## (undated) DEVICE — WRAP CO-FLEX 4IN X 5YD STERIL - SELF-ADHERENT (18/CA)

## (undated) DEVICE — GLOVE BIOGEL SZ 8 SURGICAL PF LTX - (50PR/BX 4BX/CA)

## (undated) DEVICE — SLING ORTH UNV TIETX VLFM ARM

## (undated) DEVICE — DRAPE 36X28IN RAD CARM BND BG - (25/CA) O

## (undated) DEVICE — SHEATH RO 4F 10CM (10EA/BX)

## (undated) DEVICE — SUTURE 0 VICRYL PLUS CT-1 - 36 INCH (36/BX)

## (undated) DEVICE — SENSOR SPO2 NEO LNCS ADHESIVE (20/BX) SEE USER NOTES

## (undated) DEVICE — SET LEADWIRE 5 LEAD BEDSIDE DISPOSABLE ECG (1SET OF 5/EA)

## (undated) DEVICE — GOWN SURGEONS X-LARGE - DISP. (30/CA)

## (undated) DEVICE — SUTURE 2-0 VICRYL PLUS CT-1 - 8 X 18 INCH(12/BX)

## (undated) DEVICE — BIT DRILL 2.0MM QC (4TX1+2TX2=8)

## (undated) DEVICE — GLOVE BIOGEL SZ 6.5 SURGICAL PF LTX (50PR/BX 4BX/CA)

## (undated) DEVICE — GLOVE BIOGEL PI INDICATOR SZ 7.0 SURGICAL PF LF - (50/BX 4BX/CA)

## (undated) DEVICE — Device

## (undated) DEVICE — DRAPE U ORTHOPEDIC - (10/BX)

## (undated) DEVICE — KIT  I.V. START (100EA/CA)

## (undated) DEVICE — SUTURE 1 CHROMIC CTX ETHICON - (36PK/BX)

## (undated) DEVICE — GLOVE BIOGEL INDICATOR SZ 9 SURGICAL PF LTX - (160/CA)

## (undated) DEVICE — BLANKET UNDERBODY FULL ACCES - (5/CA)

## (undated) DEVICE — GLOVE BIOGEL PI ORTHO SZ 6 1/2 SURGICAL PF LF (40PR/BX)

## (undated) DEVICE — SET EXTENSION WITH 2 PORTS (48EA/CA) ***PART #2C8610 IS A SUBSTITUTE*****

## (undated) DEVICE — SUTURE GENERAL

## (undated) DEVICE — SUTURE 3-0 VICRYL PLUS CT-1 - 36 INCH (36/BX)

## (undated) DEVICE — SUTURE 0 VICRYL PLUS CT 36 (36PK/BX)"

## (undated) DEVICE — PADDING CAST 4 IN STERILE - 4 X 4 YDS (24/CA)

## (undated) DEVICE — TAPE CLOTH MEDIPORE 6 INCH - (12RL/CA)

## (undated) DEVICE — WATER IRRIG. STER. 1500 ML - (9/CA)

## (undated) DEVICE — SUTURE 0 VICRYL PLUS CT-1 - 8 X 18 INCH (12/BX)

## (undated) DEVICE — DETERGENT RENUZYME PLUS 10 OZ PACKET (50/BX)

## (undated) DEVICE — PACK C-SECTION (2EA/CA)

## (undated) DEVICE — BANDAGE ELASTIC 4 HONEYCOMB - 4"X5YD LF (20/CA)"

## (undated) DEVICE — TRAY BLADDER CARE W/ 16 FR FOLEY CATHETER STATLOCK  (10/CA)

## (undated) DEVICE — GLOVE BIOGEL INDICATOR SZ 7.5 SURGICAL PF LTX - (50PR/BX 4BX/CA)

## (undated) DEVICE — CLEANER ELECTRO-SURGICAL TIP - (25/BX 4BX/CA)

## (undated) DEVICE — GOWN WARMING STANDARD FLEX - (30/CA)

## (undated) DEVICE — ELECTRODE 850 FOAM ADHESIVE - HYDROGEL RADIOTRNSPRNT (50/PK)